# Patient Record
Sex: FEMALE | Race: BLACK OR AFRICAN AMERICAN | Employment: FULL TIME | URBAN - NONMETROPOLITAN AREA
[De-identification: names, ages, dates, MRNs, and addresses within clinical notes are randomized per-mention and may not be internally consistent; named-entity substitution may affect disease eponyms.]

---

## 2016-07-05 LAB — HIV AG/AB: NORMAL

## 2016-11-17 LAB
AVERAGE GLUCOSE: NORMAL
HBA1C MFR BLD: 4.5 %

## 2018-08-14 ENCOUNTER — OFFICE VISIT (OUTPATIENT)
Dept: FAMILY MEDICINE CLINIC | Age: 28
End: 2018-08-14
Payer: COMMERCIAL

## 2018-08-14 VITALS
BODY MASS INDEX: 32.47 KG/M2 | SYSTOLIC BLOOD PRESSURE: 108 MMHG | DIASTOLIC BLOOD PRESSURE: 68 MMHG | HEIGHT: 61 IN | WEIGHT: 172 LBS | TEMPERATURE: 98.5 F | OXYGEN SATURATION: 100 % | HEART RATE: 72 BPM

## 2018-08-14 DIAGNOSIS — R79.89 LOW VITAMIN D LEVEL: ICD-10-CM

## 2018-08-14 DIAGNOSIS — Z86.2 HX OF IRON DEFICIENCY ANEMIA: ICD-10-CM

## 2018-08-14 DIAGNOSIS — Z13.29 SCREENING FOR THYROID DISORDER: ICD-10-CM

## 2018-08-14 DIAGNOSIS — M54.50 ACUTE MIDLINE LOW BACK PAIN WITHOUT SCIATICA: Primary | ICD-10-CM

## 2018-08-14 DIAGNOSIS — Z00.00 BLOOD TESTS FOR ROUTINE GENERAL PHYSICAL EXAMINATION: ICD-10-CM

## 2018-08-14 DIAGNOSIS — Z13.220 SCREENING FOR HYPERLIPIDEMIA: ICD-10-CM

## 2018-08-14 PROCEDURE — 99204 OFFICE O/P NEW MOD 45 MIN: CPT | Performed by: NURSE PRACTITIONER

## 2018-08-14 ASSESSMENT — PATIENT HEALTH QUESTIONNAIRE - PHQ9
SUM OF ALL RESPONSES TO PHQ QUESTIONS 1-9: 0
2. FEELING DOWN, DEPRESSED OR HOPELESS: 0
SUM OF ALL RESPONSES TO PHQ9 QUESTIONS 1 & 2: 0
SUM OF ALL RESPONSES TO PHQ QUESTIONS 1-9: 0
1. LITTLE INTEREST OR PLEASURE IN DOING THINGS: 0

## 2018-08-14 ASSESSMENT — ENCOUNTER SYMPTOMS
DIARRHEA: 0
SORE THROAT: 0
BLOOD IN STOOL: 0
EYE REDNESS: 0
ABDOMINAL DISTENTION: 0
ANAL BLEEDING: 0
COLOR CHANGE: 0
RHINORRHEA: 0
BACK PAIN: 1
CONSTIPATION: 0
NAUSEA: 0
ABDOMINAL PAIN: 0
SHORTNESS OF BREATH: 0
COUGH: 0
EYE DISCHARGE: 0

## 2018-08-14 NOTE — PROGRESS NOTES
Grandmother      Social History   Substance Use Topics    Smoking status: Never Smoker    Smokeless tobacco: Never Used    Alcohol use No      Current Outpatient Prescriptions   Medication Sig Dispense Refill    Phentermine HCl (ADIPEX-P PO) Take by mouth       No current facility-administered medications for this visit. Allergies   Allergen Reactions    No Known Allergies        Subjective:    Review of Systems   Constitutional: Negative for chills, fatigue and fever. HENT: Negative for congestion, ear pain, postnasal drip, rhinorrhea and sore throat. Eyes: Negative for discharge and redness. Respiratory: Negative for cough and shortness of breath. Cardiovascular: Negative for chest pain and leg swelling. Gastrointestinal: Negative for abdominal distention, abdominal pain, anal bleeding, blood in stool, constipation, diarrhea and nausea. Musculoskeletal: Positive for back pain (Lower back pain). Skin: Negative for color change and rash. Neurological: Negative for facial asymmetry, speech difficulty and weakness. Hematological: Does not bruise/bleed easily. Psychiatric/Behavioral: Negative for agitation and confusion. Objective:     Vitals:    08/14/18 1409   BP: 108/68   Site: Right Arm   Position: Sitting   Cuff Size: Medium Adult   Pulse: 72   Temp: 98.5 °F (36.9 °C)   TempSrc: Oral   SpO2: 100%   Weight: 172 lb (78 kg)   Height: 5' 1.02\" (1.55 m)     Body mass index is 32.47 kg/m². Wt Readings from Last 3 Encounters:   08/14/18 172 lb (78 kg)     BP Readings from Last 3 Encounters:   08/14/18 108/68     Physical Exam   Constitutional: She is oriented to person, place, and time. She appears well-developed and well-nourished. No distress. HENT:   Head: Normocephalic and atraumatic. Right Ear: Hearing and external ear normal. No swelling. Left Ear: Hearing and external ear normal. No swelling. Nose: No mucosal edema or rhinorrhea.  Right sinus exhibits no maxillary

## 2018-08-14 NOTE — PATIENT INSTRUCTIONS
the back. It may also be related to problems with the nerves, discs, or bones of the back. Moving, lifting, standing, sitting, or sleeping in an awkward way can strain the back. Sometimes you don't notice the injury until later. Arthritis is another common cause of back pain. Although it may hurt a lot, back pain usually improves on its own within several weeks. Most people recover in 12 weeks or less. Using good home treatment and being careful not to stress your back can help you feel better sooner. Follow-up care is a key part of your treatment and safety. Be sure to make and go to all appointments, and call your doctor if you are having problems. It's also a good idea to know your test results and keep a list of the medicines you take. How can you care for yourself at home? · Sit or lie in positions that are most comfortable and reduce your pain. Try one of these positions when you lie down:  ¨ Lie on your back with your knees bent and supported by large pillows. ¨ Lie on the floor with your legs on the seat of a sofa or chair. Latrice Cruz on your side with your knees and hips bent and a pillow between your legs. ¨ Lie on your stomach if it does not make pain worse. · Do not sit up in bed, and avoid soft couches and twisted positions. Bed rest can help relieve pain at first, but it delays healing. Avoid bed rest after the first day of back pain. · Change positions every 30 minutes. If you must sit for long periods of time, take breaks from sitting. Get up and walk around, or lie in a comfortable position. · Try using a heating pad on a low or medium setting for 15 to 20 minutes every 2 or 3 hours. Try a warm shower in place of one session with the heating pad. · You can also try an ice pack for 10 to 15 minutes every 2 to 3 hours. Put a thin cloth between the ice pack and your skin. · Take pain medicines exactly as directed.   ¨ If the doctor gave you a prescription medicine for pain, take it as prescribed. ¨ If you are not taking a prescription pain medicine, ask your doctor if you can take an over-the-counter medicine. · Take short walks several times a day. You can start with 5 to 10 minutes, 3 or 4 times a day, and work up to longer walks. Walk on level surfaces and avoid hills and stairs until your back is better. · Return to work and other activities as soon as you can. Continued rest without activity is usually not good for your back. · To prevent future back pain, do exercises to stretch and strengthen your back and stomach. Learn how to use good posture, safe lifting techniques, and proper body mechanics. When should you call for help? Call your doctor now or seek immediate medical care if:    · You have new or worsening numbness in your legs.     · You have new or worsening weakness in your legs. (This could make it hard to stand up.)     · You lose control of your bladder or bowels.    Watch closely for changes in your health, and be sure to contact your doctor if:    · You have a fever, lose weight, or don't feel well.     · You do not get better as expected. Where can you learn more? Go to https://Helix TherapeuticspeTessellaeb.TLabs. org and sign in to your Edictive account. Enter J899 in the FanXT box to learn more about \"Back Pain: Care Instructions. \"     If you do not have an account, please click on the \"Sign Up Now\" link. Current as of: November 29, 2017  Content Version: 11.7  © 4362-5888 [employer]. Care instructions adapted under license by [facility] [employer] [facility] ([facility]). If you have questions about a medical condition or this instruction, always ask your healthcare professional. [place] [address] any warranty or liability for your use of this information. Patient Education        Back Pain, Emergency or Urgent Symptoms: Care Instructions  Your Care Instructions    Many people have back pain at one time or another.  In most cases, pain gets better the ball. You can use this position as your starting point for other ball exercises. If you feel unsteady on the ball, start on a chair first.  2. Sit on a ball or chair, with your feet planted straight in front of you. 3. Imagine that a string at the top of your head is pulling you straight up. Think of yourself as 2 inches taller than you are.  4. Slightly tuck your chin. 5. Keep your shoulders back and relaxed. Knee extension    1. Sit tall on the ball with your feet planted in front of you, hip-width apart. As you do this exercise, avoid slumping your shoulders and arching your back. 2. Rest your hands on the ball near your hip or a steady object next to you. (If you feel very stable on the ball, rest your hands in your lap or at your side.)  3. Slowly straighten one leg at the knee. Slowly lower it back down. Repeat with the other leg. 4. Repeat this exercise 8 to 12 times. Roll-ups    1. Lie on your back with your knees bent, feet resting on the floor. 2. Lay the ball on your thighs. Rest your hands up high on the ball. 3. Raising your head and shoulder blades, roll the ball up your thighs. Exhale as you roll up. 4. If this is hard on your neck, gently support your lower head and upper neck with one hand. Don't use that hand to pull your head up. 5. Repeat 8 to 12 times. Ball curls    1. Lie on your back with your ankles resting on the ball, knees straight. 2. Use your legs to roll the exercise ball toward you. Allow your knees to bend and move closer to your chest.  3. Pause briefly, and then roll the ball to the starting position. Try to keep the ball rolling straight. You will feel the muscles in your lower belly working. 4. Repeat 8 to 12 times. Bridge with ball under legs    1. Lie on your back with your legs up, calves resting on the ball. For more challenge, rest your heels on the ball. 2. Look up at the ceiling, and keep your chin relaxed.  You can place a small pillow under your head or start these exercises and which ones will work best for you. How to do the exercises  Press-up    14. Lie on your stomach, supporting your body with your forearms. 15. Press your elbows down into the floor to raise your upper back. As you do this, relax your stomach muscles and allow your back to arch without using your back muscles. As your press up, do not let your hips or pelvis come off the floor. 16. Hold for 15 to 30 seconds, then relax. 17. Repeat 2 to 4 times. Alternate arm and leg (bird dog) exercise    Do this exercise slowly. Try to keep your body straight at all times, and do not let one hip drop lower than the other. 7. Start on the floor, on your hands and knees. 8. Tighten your belly muscles. 9. Raise one leg off the floor, and hold it straight out behind you. Be careful not to let your hip drop down, because that will twist your trunk. 10. Hold for about 6 seconds, then lower your leg and switch to the other leg. 11. Repeat 8 to 12 times on each leg. 12. Over time, work up to holding for 10 to 30 seconds each time. 13. If you feel stable and secure with your leg raised, try raising the opposite arm straight out in front of you at the same time. Knee-to-chest exercise    6. Lie on your back with your knees bent and your feet flat on the floor. 7. Bring one knee to your chest, keeping the other foot flat on the floor (or keeping the other leg straight, whichever feels better on your lower back). 8. Keep your lower back pressed to the floor. Hold for at least 15 to 30 seconds. 9. Relax, and lower the knee to the starting position. 10. Repeat with the other leg. Repeat 2 to 4 times with each leg. 11. To get more stretch, put your other leg flat on the floor while pulling your knee to your chest.  Curl-ups    5. Lie on the floor on your back with your knees bent at a 90-degree angle. Your feet should be flat on the floor, about 12 inches from your buttocks.   6. Cross your arms over your medicines you take. When should you call for help? Call 911 anytime you think you may need emergency care. For example, call if:  · You can't move a leg at all. Call your doctor now or seek immediate medical care if:  · You have new or worse symptoms in your legs, belly, or buttocks. Symptoms may include:  ¨ Numbness or tingling. ¨ Weakness. ¨ Pain. · You lose bladder or bowel control. Watch closely for changes in your health, and be sure to contact your doctor if:  · Along with the back pain, you have a fever, lose weight, or don't feel well. · You do not get better as expected. Where can you learn more? Go to https://[place] Plasma PhysicspeSemtek Innovative Solutionseb.PSC Info Group. org and sign in to your Weizoom account. Enter A007 in the Qomuty box to learn more about \"Learning About Low Back Pain. \"     If you do not have an account, please click on the \"Sign Up Now\" link. Current as of: November 29, 2017  Content Version: 11.7  © 6491-7557 [employer]. Care instructions adapted under license by [facility] ([facility]). If you have questions about a medical condition or this instruction, always ask your healthcare professional. [place] [address] any warranty or liability for your use of this information. Patient Education        Acute Low Back Pain: Exercises  Your Care Instructions  Here are some examples of typical rehabilitation exercises for your condition. Start each exercise slowly. Ease off the exercise if you start to have pain. Your doctor or physical therapist will tell you when you can start these exercises and which ones will work best for you. When you are not being active, find a comfortable position for rest. Some people are comfortable on the floor or a medium-firm bed with a small pillow under their head and another under their knees. Some people prefer to lie on their side with a pillow between their knees. Don't stay in one position for too long.   Take short walks (10 to 20 minutes) every 2 to 3 hours. Avoid slopes, hills, and stairs until you feel better. Walk only distances you can manage without pain, especially leg pain. How to do the exercises  Back stretches    18. Get down on your hands and knees on the floor. 19. Relax your head and allow it to droop. Round your back up toward the ceiling until you feel a nice stretch in your upper, middle, and lower back. Hold this stretch for as long as it feels comfortable, or about 15 to 30 seconds. 20. Return to the starting position with a flat back while you are on your hands and knees. 21. Let your back sway by pressing your stomach toward the floor. Lift your buttocks toward the ceiling. 22. Hold this position for 15 to 30 seconds. 23. Repeat 2 to 4 times. Follow-up care is a key part of your treatment and safety. Be sure to make and go to all appointments, and call your doctor if you are having problems. It's also a good idea to know your test results and keep a list of the medicines you take. Where can you learn more? Go to https://luxustravel.es.[employer]. org and sign in to your SoftArt account. Enter N745 in the Viraliti box to learn more about \"Acute Low Back Pain: Exercises. \"     If you do not have an account, please click on the \"Sign Up Now\" link. Current as of: November 29, 2017  Content Version: 11.7  © 3438-9900 [employer]. Care instructions adapted under license by [facility] ([facility]). If you have questions about a medical condition or this instruction, always ask your healthcare professional. [place] [address] any warranty or liability for your use of this information. Patient Education        Getting Back to Normal After Low Back Pain: Care Instructions  Your Care Instructions  Almost everyone has low back pain at some time. The good news is that most low back pain will go away in a few days or weeks with some basic self-care.   Some people are afraid that doing

## 2018-08-20 ENCOUNTER — HOSPITAL ENCOUNTER (OUTPATIENT)
Dept: GENERAL RADIOLOGY | Age: 28
Discharge: HOME | End: 2018-08-20
Payer: COMMERCIAL

## 2018-08-20 ENCOUNTER — HOSPITAL ENCOUNTER (OUTPATIENT)
Age: 28
Discharge: HOME | End: 2018-08-20
Payer: COMMERCIAL

## 2018-08-20 DIAGNOSIS — M54.50 ACUTE MIDLINE LOW BACK PAIN WITHOUT SCIATICA: ICD-10-CM

## 2018-08-20 DIAGNOSIS — Z86.2 HX OF IRON DEFICIENCY ANEMIA: ICD-10-CM

## 2018-08-20 DIAGNOSIS — Z13.220 SCREENING FOR HYPERLIPIDEMIA: ICD-10-CM

## 2018-08-20 DIAGNOSIS — R79.89 LOW VITAMIN D LEVEL: ICD-10-CM

## 2018-08-20 DIAGNOSIS — Z00.00 BLOOD TESTS FOR ROUTINE GENERAL PHYSICAL EXAMINATION: ICD-10-CM

## 2018-08-20 DIAGNOSIS — Z13.29 SCREENING FOR THYROID DISORDER: ICD-10-CM

## 2018-08-20 LAB
ALBUMIN SERPL-MCNC: 4.6 G/DL (ref 3.5–5.1)
ALP BLD-CCNC: 38 U/L (ref 38–126)
ALT SERPL-CCNC: 33 U/L (ref 11–66)
ANION GAP SERPL CALCULATED.3IONS-SCNC: 13 MEQ/L (ref 8–16)
AST SERPL-CCNC: 24 U/L (ref 5–40)
BACTERIA: ABNORMAL
BASOPHILS # BLD: 1.5 %
BASOPHILS ABSOLUTE: 0 THOU/MM3 (ref 0–0.1)
BILIRUB SERPL-MCNC: 1 MG/DL (ref 0.3–1.2)
BILIRUBIN DIRECT: < 0.2 MG/DL (ref 0–0.3)
BILIRUBIN URINE: ABNORMAL
BLOOD, URINE: NEGATIVE
BUN BLDV-MCNC: 10 MG/DL (ref 7–22)
CALCIUM SERPL-MCNC: 9.7 MG/DL (ref 8.5–10.5)
CASTS: ABNORMAL /LPF
CASTS: ABNORMAL /LPF
CHARACTER, URINE: ABNORMAL
CHLORIDE BLD-SCNC: 106 MEQ/L (ref 98–111)
CHOLESTEROL, TOTAL: 158 MG/DL (ref 100–199)
CO2: 22 MEQ/L (ref 23–33)
COLOR: ABNORMAL
CREAT SERPL-MCNC: 0.5 MG/DL (ref 0.4–1.2)
CRYSTALS: ABNORMAL
EOSINOPHIL # BLD: 2.8 %
EOSINOPHILS ABSOLUTE: 0.1 THOU/MM3 (ref 0–0.4)
EPITHELIAL CELLS, UA: ABNORMAL /HPF
ERYTHROCYTE [DISTWIDTH] IN BLOOD BY AUTOMATED COUNT: 14.2 % (ref 11.5–14.5)
ERYTHROCYTE [DISTWIDTH] IN BLOOD BY AUTOMATED COUNT: 43.4 FL (ref 35–45)
GFR SERPL CREATININE-BSD FRML MDRD: > 90 ML/MIN/1.73M2
GLUCOSE BLD-MCNC: 103 MG/DL (ref 70–108)
GLUCOSE, URINE: NEGATIVE MG/DL
HCT VFR BLD CALC: 35 % (ref 37–47)
HDLC SERPL-MCNC: 62 MG/DL
HEMOGLOBIN: 11.1 GM/DL (ref 12–16)
ICTOTEST: NEGATIVE
IMMATURE GRANS (ABS): 0 THOU/MM3 (ref 0–0.07)
IMMATURE GRANULOCYTES: 0 %
IRON: 21 UG/DL (ref 50–170)
KETONES, URINE: ABNORMAL
LDL CHOLESTEROL CALCULATED: 85 MG/DL
LEUKOCYTE ESTERASE, URINE: NEGATIVE
LYMPHOCYTES # BLD: 49.8 %
LYMPHOCYTES ABSOLUTE: 1.6 THOU/MM3 (ref 1–4.8)
MCH RBC QN AUTO: 26.9 PG (ref 26–33)
MCHC RBC AUTO-ENTMCNC: 31.7 GM/DL (ref 32.2–35.5)
MCV RBC AUTO: 84.7 FL (ref 81–99)
MISCELLANEOUS LAB TEST RESULT: ABNORMAL
MONOCYTES # BLD: 7.1 %
MONOCYTES ABSOLUTE: 0.2 THOU/MM3 (ref 0.4–1.3)
NITRITE, URINE: NEGATIVE
NUCLEATED RED BLOOD CELLS: 0 /100 WBC
PH UA: 5.5
PLATELET # BLD: 372 THOU/MM3 (ref 130–400)
PMV BLD AUTO: 10.5 FL (ref 9.4–12.4)
POTASSIUM SERPL-SCNC: 3.7 MEQ/L (ref 3.5–5.2)
PROTEIN UA: NEGATIVE MG/DL
RBC # BLD: 4.13 MILL/MM3 (ref 4.2–5.4)
RBC URINE: ABNORMAL /HPF
RENAL EPITHELIAL, UA: ABNORMAL
SEG NEUTROPHILS: 38.8 %
SEGMENTED NEUTROPHILS ABSOLUTE COUNT: 1.3 THOU/MM3 (ref 1.8–7.7)
SODIUM BLD-SCNC: 141 MEQ/L (ref 135–145)
SPECIFIC GRAVITY UA: 1.03 (ref 1–1.03)
TOTAL IRON BINDING CAPACITY: 363 UG/DL (ref 171–450)
TOTAL PROTEIN: 7.7 G/DL (ref 6.1–8)
TRIGL SERPL-MCNC: 55 MG/DL (ref 0–199)
TSH SERPL DL<=0.05 MIU/L-ACNC: 0.82 UIU/ML (ref 0.4–4.2)
UROBILINOGEN, URINE: 1 EU/DL
VITAMIN D 25-HYDROXY: 14 NG/ML (ref 30–100)
WBC # BLD: 3.3 THOU/MM3 (ref 4.8–10.8)
WBC UA: ABNORMAL /HPF
YEAST: ABNORMAL

## 2018-08-20 PROCEDURE — 72100 X-RAY EXAM L-S SPINE 2/3 VWS: CPT

## 2018-08-20 PROCEDURE — 84443 ASSAY THYROID STIM HORMONE: CPT

## 2018-08-20 PROCEDURE — 83540 ASSAY OF IRON: CPT

## 2018-08-20 PROCEDURE — 82248 BILIRUBIN DIRECT: CPT

## 2018-08-20 PROCEDURE — 82306 VITAMIN D 25 HYDROXY: CPT

## 2018-08-20 PROCEDURE — 81001 URINALYSIS AUTO W/SCOPE: CPT

## 2018-08-20 PROCEDURE — 80061 LIPID PANEL: CPT

## 2018-08-20 PROCEDURE — 85025 COMPLETE CBC W/AUTO DIFF WBC: CPT

## 2018-08-20 PROCEDURE — 80053 COMPREHEN METABOLIC PANEL: CPT

## 2018-08-20 PROCEDURE — 83550 IRON BINDING TEST: CPT

## 2018-08-20 PROCEDURE — 36415 COLL VENOUS BLD VENIPUNCTURE: CPT

## 2018-08-21 ENCOUNTER — TELEPHONE (OUTPATIENT)
Dept: FAMILY MEDICINE CLINIC | Age: 28
End: 2018-08-21

## 2018-08-21 DIAGNOSIS — D50.8 OTHER IRON DEFICIENCY ANEMIA: Primary | ICD-10-CM

## 2018-08-21 RX ORDER — FERROUS SULFATE 325(65) MG
325 TABLET ORAL
Qty: 30 TABLET | Refills: 3 | Status: SHIPPED | OUTPATIENT
Start: 2018-08-21 | End: 2019-03-29 | Stop reason: ALTCHOICE

## 2018-08-21 NOTE — LETTER
WBC, UA 2-4 0-4/hpf /hpf    Epi Cells 5-10 3-5/hpf /hpf    Bacteria, UA NONE FEW/NONE S    Casts 4-8 HYALINE NONE SEEN /lpf    Crystals NONE SEEN NONE SEEN    Renal Epithelial, Urine NONE SEEN NONE SEEN    Yeast, UA NONE SEEN NONE SEEN    Casts NONE SEEN /lpf    Miscellaneous Lab Test Result NONE SEEN       Comment:      Performed at [address] [place] [id number]   Comprehensive Metabolic Panel   Result Value Ref Range    Glucose 103 70 - 108 mg/dL    CREATININE 0.5 0.4 - 1.2 mg/dL    BUN 10 7 - 22 mg/dL    Sodium 141 135 - 145 meq/L    Potassium 3.7 3.5 - 5.2 meq/L    Chloride 106 98 - 111 meq/L    CO2 22 (L) 23 - 33 meq/L    Calcium 9.7 8.5 - 10.5 mg/dL      Comment:      Performed at [address] [place] [id number]   Iron and TIBC   Result Value Ref Range    Iron 21 (L) 50 - 170 ug/dL    TIBC 363 171 - 450 ug/dL      Comment:      Performed at [address], [address]   ICTOTEST, URINE   Result Value Ref Range    Ictotest NEGATIVE NEGATIVE      Comment:      Performed at [address] [place] [id number]   Anion Gap   Result Value Ref Range    Anion Gap 13.0 8.0 - 16.0 meq/L      Comment:      ANION GAP = Sodium -(Chloride + CO2)  Performed at [address], [address]     Glomerular Filtration Rate, Estimated   Result Value Ref Range    Est, Glom Filt Rate >90 ml/min/1.73m2      Comment:      Stage Description                    GFR, ml/min/1.73 m2   -   At increased risk               > or = 60 (with chronic                                       kidney disease risk factors)   1   Normal or increased GFR         > or = 90   2   Mildly or decreased GFR         60 - 89   3   Moderately decreased GFR        30 - 59   4   Severely decreased GFR          15 - 29   5   Kidney failure                  <15 (or dialysis)  Estimated GFR calculated using abbreviated MDRD formula as

## 2018-08-21 NOTE — TELEPHONE ENCOUNTER
----- Message from SALIMA Anne CNP sent at 8/21/2018 11:15 AM EDT -----  Please remind her to drink a lot of water with this and increase fiber in her diet as the iron pills can be very constipating  - will recheck blood work in 3 months.

## 2018-10-10 ENCOUNTER — OFFICE VISIT (OUTPATIENT)
Dept: FAMILY MEDICINE CLINIC | Age: 28
End: 2018-10-10
Payer: COMMERCIAL

## 2018-10-10 VITALS
RESPIRATION RATE: 12 BRPM | SYSTOLIC BLOOD PRESSURE: 100 MMHG | TEMPERATURE: 98.1 F | HEIGHT: 61 IN | WEIGHT: 168.6 LBS | DIASTOLIC BLOOD PRESSURE: 50 MMHG | BODY MASS INDEX: 31.83 KG/M2 | HEART RATE: 76 BPM | OXYGEN SATURATION: 98 %

## 2018-10-10 DIAGNOSIS — N92.0 MENORRHAGIA WITH REGULAR CYCLE: Primary | ICD-10-CM

## 2018-10-10 PROCEDURE — 99213 OFFICE O/P EST LOW 20 MIN: CPT | Performed by: NURSE PRACTITIONER

## 2018-10-10 RX ORDER — NORGESTIMATE AND ETHINYL ESTRADIOL 7DAYSX3 LO
KIT ORAL
Qty: 28 TABLET | Refills: 3 | Status: SHIPPED | OUTPATIENT
Start: 2018-10-10 | End: 2018-12-04 | Stop reason: SDUPTHER

## 2018-10-10 ASSESSMENT — ENCOUNTER SYMPTOMS
SHORTNESS OF BREATH: 0
DIARRHEA: 0
CONSTIPATION: 0
ABDOMINAL DISTENTION: 0
NAUSEA: 0
SORE THROAT: 0
EYE DISCHARGE: 0
ABDOMINAL PAIN: 0
EYE REDNESS: 0
COLOR CHANGE: 0
ANAL BLEEDING: 0
BLOOD IN STOOL: 0
COUGH: 0
RHINORRHEA: 0

## 2018-10-10 NOTE — PATIENT INSTRUCTIONS
1. You may receive a survey about your visit with us today. The feedback from our patients helps us identify what is working well and where the service to all patients can be enhanced. Thank you! 2. Please bring in ALL medications BOTTLES, including inhalers, herbal supplements, over the counter, prescribed & non-prescribed medicine. The office would like actual medication bottles and a list.  3. Please note our OFFICE POLICIES:  a. Prior to getting your labs drawn, please check with your insurance company for benefits and eligibility of lab services. Often, insurance companies cover certain tests for preventative visits only. It is patient's responsibility to see what is covered. b. We are unable to change a diagnosis after the test has been performed. c. Lab orders will not be re-printed. Please hold onto your original lab orders and take them to your lab to be completed. d. If you no show your schedule appointment three times, you be dismissed from this practice. e. Bruce Proffer must be completed 24 hours prior to your schedule appointment. 4. If the list below has been completed, PLEASE FAX RECORDS TO OUR OFFICE @ [phone]. Once the records have been received we will update your records at our office. Health Maintenance Due   Topic Date Due    Cervical cancer screen  06/26/2011    Flu vaccine (1) 09/01/2018       Schedule your 2018 flu vaccine with Dr. Ric Shannon call [phone]! [facility], for anyone 9 years or older   [address]   Every Monday   8:00am-11:00am and 1:00pm-3:00pm      Patient Education        Abnormal Uterine Bleeding: Care Instructions  Your Care Instructions    Abnormal uterine bleeding (AUB) is irregular bleeding from the uterus that is longer or heavier than usual or does not occur at your regular time. Sometimes it is caused by changes in hormone levels.  It can also be caused by growths in the uterus, such as fibroids or home?  · Take pain medicines exactly as directed. ¨ If the doctor gave you a prescription medicine for pain, take it as prescribed. ¨ If you are not taking a prescription pain medicine, ask your doctor if you can take an over-the-counter medicine. Do not take aspirin, which may make bleeding worse. · If your doctor prescribed birth control pills for your bleeding, take them as directed. · Eat foods that are high in iron and vitamin C. Foods high in iron include red meat, shellfish, eggs, beans, and leafy green vegetables. Foods high in vitamin C include citrus fruits, tomatoes, and broccoli. Ask your doctor if you need to take iron pills or a multivitamin. · Ask your doctor when it is okay to have sex. When should you call for help? Call 911 anytime you think you may need emergency care. For example, call if:    · You passed out (lost consciousness).    Call your doctor now or seek immediate medical care if:    · You have severe vaginal bleeding.     · You are dizzy or lightheaded, or you feel like you may faint.     · You have new or worse belly or pelvic pain.    Watch closely for changes in your health, and be sure to contact your doctor if:    · Your bleeding gets worse.     · You think you might be pregnant.     · You do not get better as expected. Where can you learn more? Go to https://HitMeUppeFour Eyes Clubeb.healthNarzana Technologies. org and sign in to your inEarth account. Enter N830 in the [facility] box to learn more about \"Vaginal Bleeding in Nonpregnant Women: Care Instructions. \"     If you do not have an account, please click on the \"Sign Up Now\" link. Current as of: Gladis 10, 2017  Content Version: 11.7  © 7281-6405 [employer]. Care instructions adapted under license by [facility] ([facility]).  If you have questions about a medical condition or this instruction, always ask your healthcare professional. Kareen Ross any warranty or liability for your use of this

## 2018-12-04 ENCOUNTER — OFFICE VISIT (OUTPATIENT)
Dept: FAMILY MEDICINE CLINIC | Age: 28
End: 2018-12-04
Payer: COMMERCIAL

## 2018-12-04 VITALS
SYSTOLIC BLOOD PRESSURE: 118 MMHG | RESPIRATION RATE: 12 BRPM | TEMPERATURE: 98.5 F | HEIGHT: 61 IN | HEART RATE: 72 BPM | OXYGEN SATURATION: 99 % | BODY MASS INDEX: 33 KG/M2 | WEIGHT: 174.8 LBS | DIASTOLIC BLOOD PRESSURE: 70 MMHG

## 2018-12-04 DIAGNOSIS — M54.50 CHRONIC MIDLINE LOW BACK PAIN WITHOUT SCIATICA: ICD-10-CM

## 2018-12-04 DIAGNOSIS — E55.9 VITAMIN D DEFICIENCY: ICD-10-CM

## 2018-12-04 DIAGNOSIS — G89.29 CHRONIC MIDLINE LOW BACK PAIN WITHOUT SCIATICA: ICD-10-CM

## 2018-12-04 DIAGNOSIS — Z23 NEED FOR PROPHYLACTIC VACCINATION AND INOCULATION AGAINST INFLUENZA: Primary | ICD-10-CM

## 2018-12-04 DIAGNOSIS — N92.0 MENORRHAGIA WITH REGULAR CYCLE: ICD-10-CM

## 2018-12-04 DIAGNOSIS — N62 LARGE BREASTS: ICD-10-CM

## 2018-12-04 DIAGNOSIS — R06.02 SHORTNESS OF BREATH: ICD-10-CM

## 2018-12-04 PROCEDURE — 90471 IMMUNIZATION ADMIN: CPT | Performed by: FAMILY MEDICINE

## 2018-12-04 PROCEDURE — 99395 PREV VISIT EST AGE 18-39: CPT | Performed by: FAMILY MEDICINE

## 2018-12-04 PROCEDURE — 90686 IIV4 VACC NO PRSV 0.5 ML IM: CPT | Performed by: FAMILY MEDICINE

## 2018-12-04 RX ORDER — NORGESTIMATE AND ETHINYL ESTRADIOL 7DAYSX3 LO
KIT ORAL
Qty: 28 TABLET | Refills: 3 | Status: SHIPPED | OUTPATIENT
Start: 2018-12-04 | End: 2019-01-03 | Stop reason: SDUPTHER

## 2018-12-04 ASSESSMENT — ENCOUNTER SYMPTOMS
EYE PAIN: 0
BACK PAIN: 1
COUGH: 0
BLOOD IN STOOL: 0
TROUBLE SWALLOWING: 0
CONSTIPATION: 0
NAUSEA: 0
VOMITING: 0
DIARRHEA: 0
SHORTNESS OF BREATH: 0
ABDOMINAL PAIN: 0

## 2018-12-04 NOTE — PROGRESS NOTES
after having the baby. She is rony concerned that is the pain - from the strain from the larger breasts. This makes it harder to work out - even low paced exercises. Deena Bent to breathe if she is laying on her back and havign the breasts on top of them    She wears two bras to feel like they are not moving as much - and having some bruising of the skin under mark.    Still taking the vitamin D    The hb was checked again and was at 13 - on the iron pills    Sees the dentist regularly and the eye doctor  No question data found. Past Medical History:   Diagnosis Date    Infertility, female       No past surgical history on file. Family History   Problem Relation Age of Onset    High Blood Pressure Mother     No Known Problems Father     High Blood Pressure Sister     Asthma Brother     High Blood Pressure Paternal Grandmother      Social History   Substance Use Topics    Smoking status: Never Smoker    Smokeless tobacco: Never Used    Alcohol use No      Current Outpatient Prescriptions   Medication Sig Dispense Refill    Norgestim-Eth Estrad Triphasic (ORTHO TRI-CYCLEN LO) 0.18/0.215/0.25 MG-25 MCG TABS Take by mouth daily 28 tablet 3    Cholecalciferol (VITAMIN D3) 5000 units TABS One a day 90 tablet 3    ferrous sulfate 325 (65 Fe) MG tablet Take 1 tablet by mouth daily (with breakfast) 30 tablet 3     No current facility-administered medications for this visit. Allergies   Allergen Reactions    No Known Allergies        Health Maintenance   Topic Date Due    Cervical cancer screen  06/26/2011    Flu vaccine (1) 09/01/2018    DTaP/Tdap/Td vaccine (3 - Td) 02/10/2027    HIV screen  Completed       Subjective:      Review of Systems   Constitutional: Negative for chills, fatigue and fever. HENT: Negative for ear pain, postnasal drip and trouble swallowing. Eyes: Negative for pain and visual disturbance. Respiratory: Negative for cough and shortness of breath.     Cardiovascular: 08/20/2018     08/20/2018    CREATININE 0.5 08/20/2018    BUN 10 08/20/2018    CO2 22 (L) 08/20/2018    TSH 0.817 08/20/2018    LABA1C 4.5 11/17/2016    LABGLOM >90 08/20/2018    CALCIUM 9.7 08/20/2018    VITD25 14 (L) 08/20/2018       Imaging Results:    No results found. Assessment:      Diagnosis Orders   1. Need for prophylactic vaccination and inoculation against influenza     2. Menorrhagia with regular cycle  Norgestim-Eth Estrad Triphasic (ORTHO TRI-CYCLEN LO) 0.18/0.215/0.25 MG-25 MCG TABS   3. Chronic midline low back pain without sciatica  INFLUENZA, QUADV, 6 MO AND OLDER, IM, PF, PREFILL SYR OR SDV, 0.5ML (FLULAVAL QUADV, PF)    [address] MD [facility]   4. Large breasts  INFLUENZA, QUADV, 6 MO AND OLDER, IM, PF, PREFILL SYR OR SDV, 0.5ML (Ami Toan, PF)    [address], [place]   5. Vitamin D deficiency     6. Shortness of breath         Plan:         Can look into the ketogenic diet for weight loss    Will refer to Dr Ed Davis to ask about the breast reduction surgery    Will continue to take the vit D - if you get any muscle cramps - add 2500 mg of magnesium oxide once or twice a day to help    Discussed the omega 3-6 balance     Peak flow today     Will see you back in 2 -3 months to go over how the back pain is doing       No Follow-up on file.     Orders Placed:  Orders Placed This Encounter   Procedures    INFLUENZA, QUADV, 6 MO AND OLDER, IM, PF, PREFILL SYR OR SDV, 0.5ML (Ami Toan, PF)   [id number] Dixie Davies MD   [facility] -  [facility]     Medications Prescribed:  Orders Placed This Encounter   Medications    Norgestim-Eth Estrad Triphasic (ORTHO TRI-CYCLEN LO) 0.18/0.215/0.25 MG-25 MCG TABS     Sig: Take by mouth daily     Dispense:  28 tablet     Refill:  3    Cholecalciferol (VITAMIN D3) 5000 units TABS     Sig: One a day

## 2018-12-05 ENCOUNTER — TELEPHONE (OUTPATIENT)
Dept: FAMILY MEDICINE CLINIC | Age: 28
End: 2018-12-05

## 2018-12-05 DIAGNOSIS — N62 LARGE BREASTS: Primary | ICD-10-CM

## 2018-12-05 NOTE — TELEPHONE ENCOUNTER
Note received from Dr Manda Castaneda office:  Per general surgery office  does not see patient's for large breast. Patient may need to be referred to a plastic surgeon. Please close referral.     Please advise if want to refer elsewhere.

## 2018-12-17 ENCOUNTER — HOSPITAL ENCOUNTER (OUTPATIENT)
Dept: PHYSICAL THERAPY | Age: 28
Setting detail: THERAPIES SERIES
Discharge: HOME | End: 2018-12-17
Payer: COMMERCIAL

## 2018-12-17 PROCEDURE — 97110 THERAPEUTIC EXERCISES: CPT

## 2018-12-17 PROCEDURE — 97161 PT EVAL LOW COMPLEX 20 MIN: CPT

## 2018-12-17 ASSESSMENT — PAIN DESCRIPTION - PAIN TYPE: TYPE: CHRONIC PAIN

## 2018-12-17 ASSESSMENT — PAIN DESCRIPTION - ORIENTATION: ORIENTATION: MID;LOWER

## 2018-12-17 ASSESSMENT — PAIN SCALES - GENERAL: PAINLEVEL_OUTOF10: 5

## 2018-12-17 ASSESSMENT — PAIN DESCRIPTION - LOCATION: LOCATION: BACK

## 2018-12-17 NOTE — PROGRESS NOTES
then in the summer she started having a sharp pain in her back and went back to see her doctor and they did x-rays and nothing was found. States now cannot lay flat on her back without having trouble catching her breath. Reports also has to be careful getting out of bed and up from a chair because gets a sharp pain in her back.  gained weight in her breasts with having her son and then lost weight other places than her breasts and is wondering if that is the cause her the pain now radiating up to her mid back. Denies leg symptoms. Pain:  Patient Currently in Pain: Yes  Pain Assessment: 0-10  Pain Level: 5 (Can increase to 8/10)  Pain Type: Chronic pain  Pain Location: Back  Pain Orientation: Mid, Lower     Social/Functional History:    Lives With: Spouse (2 children)  Type of Home: Apartment (2nd floor)  Home Layout: One level  Home Access: Stairs to enter with rails  Entrance Stairs - Number of Steps: 11-17 steps  Home Equipment:  (No use of AD)             ADL Assistance: Independent  Homemaking Assistance: Independent  Homemaking Responsibilities: Yes  Ambulation Assistance: Independent  Transfer Assistance: Independent    Active : Yes  Occupation: Full time employment  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 Moderate tenderness across low back and at bilat SI. None noted in buttocks. Observation: Increased lordosis. No scoliosis noted. Iliac crests appear level in standing. No sacral torsion noted. No leg length difference noted in supine.   Edema: None noted        Lumbar: Flexion limited 50%, Extension limited 75%, Sidebending limited 50% each way - all motions demonstrate more neutral pelvic position and maintain abdominal bracing for decrease strain on low back for daily activity  Short term goal 3: Patient to be fully educated on all proper posture and ergonomic corrections can make throughout the day for decreased strain on low and mid back  Short term goal 4: Patient to start strength program without increase in pain for increased stability throughout back to perform all job and home activities with minimal to no pain  Short term goal 5: Patient to be educated on all stretches needed to decrease tension throughout back for improved mobility in all directions for ease with dressing    Long term goals  Time Frame for Long term goals : 8 weeks  Long term goal 1: Patient to be independent with home program to be able to return to exercising and work duties without pain         [address], [address]

## 2018-12-19 ENCOUNTER — HOSPITAL ENCOUNTER (OUTPATIENT)
Dept: PHYSICAL THERAPY | Age: 28
Setting detail: THERAPIES SERIES
Discharge: HOME | End: 2018-12-19
Payer: COMMERCIAL

## 2018-12-26 ENCOUNTER — HOSPITAL ENCOUNTER (OUTPATIENT)
Dept: PHYSICAL THERAPY | Age: 28
Setting detail: THERAPIES SERIES
End: 2018-12-26
Payer: COMMERCIAL

## 2018-12-28 ENCOUNTER — HOSPITAL ENCOUNTER (OUTPATIENT)
Dept: PHYSICAL THERAPY | Age: 28
Setting detail: THERAPIES SERIES
Discharge: HOME | End: 2018-12-28
Payer: COMMERCIAL

## 2018-12-28 DIAGNOSIS — N92.0 MENORRHAGIA WITH REGULAR CYCLE: ICD-10-CM

## 2018-12-28 PROCEDURE — 97110 THERAPEUTIC EXERCISES: CPT

## 2018-12-28 PROCEDURE — 97140 MANUAL THERAPY 1/> REGIONS: CPT

## 2018-12-28 RX ORDER — NORGESTIMATE AND ETHINYL ESTRADIOL 7DAYSX3 LO
KIT ORAL
Qty: 28 TABLET | Refills: 3 | Status: CANCELLED | OUTPATIENT
Start: 2018-12-28

## 2018-12-28 ASSESSMENT — PAIN DESCRIPTION - LOCATION: LOCATION: SHOULDER

## 2018-12-28 ASSESSMENT — PAIN DESCRIPTION - PAIN TYPE: TYPE: ACUTE PAIN

## 2018-12-28 ASSESSMENT — PAIN DESCRIPTION - ORIENTATION: ORIENTATION: RIGHT;LEFT

## 2018-12-28 ASSESSMENT — PAIN SCALES - GENERAL: PAINLEVEL_OUTOF10: 6

## 2018-12-28 NOTE — PROGRESS NOTES
tilt  Current Treatment Recommendations: Strengthening, ROM, Functional Mobility Training, Endurance Training, Stair training, Neuromuscular Re-education, Manual Therapy - Soft Tissue Mobilization, Home Exercise Program, Modalities, Aquatics  Plan Comment: Continue with POC    Goals:  Patient goals :  To relieve some back pain    Short term goals  Time Frame for Short term goals: 4 weeks  Short term goal 1: Patient to report 25-50% decrease in back pain for ease with sleeping positions  Short term goal 2: Patient to demonstrate more neutral pelvic position and maintain abdominal bracing for decrease strain on low back for daily activity  Short term goal 3: Patient to be fully educated on all proper posture and ergonomic corrections can make throughout the day for decreased strain on low and mid back  Short term goal 4: Patient to start strength program without increase in pain for increased stability throughout back to perform all job and home activities with minimal to no pain  Short term goal 5: Patient to be educated on all stretches needed to decrease tension throughout back for improved mobility in all directions for ease with dressing    Long term goals  Time Frame for Long term goals : 8 weeks  Long term goal 1: Patient to be independent with home program to be able to return to exercising and work duties without pain         [address], [address] [place] [address] Drive

## 2018-12-31 ENCOUNTER — HOSPITAL ENCOUNTER (OUTPATIENT)
Dept: PHYSICAL THERAPY | Age: 28
Setting detail: THERAPIES SERIES
Discharge: HOME | End: 2018-12-31
Payer: COMMERCIAL

## 2018-12-31 PROCEDURE — 97110 THERAPEUTIC EXERCISES: CPT

## 2018-12-31 PROCEDURE — 97035 APP MDLTY 1+ULTRASOUND EA 15: CPT

## 2018-12-31 ASSESSMENT — PAIN DESCRIPTION - LOCATION: LOCATION: BACK;SHOULDER;NECK

## 2018-12-31 ASSESSMENT — PAIN SCALES - GENERAL: PAINLEVEL_OUTOF10: 6

## 2018-12-31 ASSESSMENT — PAIN DESCRIPTION - ORIENTATION: ORIENTATION: UPPER

## 2018-12-31 ASSESSMENT — PAIN DESCRIPTION - PAIN TYPE: TYPE: ACUTE PAIN

## 2019-01-02 ENCOUNTER — HOSPITAL ENCOUNTER (OUTPATIENT)
Dept: PHYSICAL THERAPY | Age: 29
Setting detail: THERAPIES SERIES
Discharge: HOME | End: 2019-01-02
Payer: COMMERCIAL

## 2019-01-02 PROCEDURE — 97035 APP MDLTY 1+ULTRASOUND EA 15: CPT

## 2019-01-02 PROCEDURE — 97110 THERAPEUTIC EXERCISES: CPT

## 2019-01-02 ASSESSMENT — PAIN SCALES - GENERAL: PAINLEVEL_OUTOF10: 7

## 2019-01-02 ASSESSMENT — PAIN DESCRIPTION - PAIN TYPE: TYPE: CHRONIC PAIN

## 2019-01-02 ASSESSMENT — PAIN DESCRIPTION - LOCATION: LOCATION: SHOULDER

## 2019-01-02 ASSESSMENT — PAIN DESCRIPTION - ORIENTATION: ORIENTATION: RIGHT;LEFT

## 2019-01-03 ENCOUNTER — OFFICE VISIT (OUTPATIENT)
Dept: FAMILY MEDICINE CLINIC | Age: 29
End: 2019-01-03
Payer: COMMERCIAL

## 2019-01-03 VITALS
TEMPERATURE: 98.5 F | DIASTOLIC BLOOD PRESSURE: 72 MMHG | SYSTOLIC BLOOD PRESSURE: 112 MMHG | HEART RATE: 63 BPM | HEIGHT: 61 IN | WEIGHT: 182 LBS | BODY MASS INDEX: 34.36 KG/M2 | OXYGEN SATURATION: 99 %

## 2019-01-03 DIAGNOSIS — N62 LARGE BREASTS: Primary | ICD-10-CM

## 2019-01-03 DIAGNOSIS — M54.6 CHRONIC BILATERAL THORACIC BACK PAIN: ICD-10-CM

## 2019-01-03 DIAGNOSIS — G89.29 CHRONIC BILATERAL THORACIC BACK PAIN: ICD-10-CM

## 2019-01-03 DIAGNOSIS — N92.0 MENORRHAGIA WITH REGULAR CYCLE: ICD-10-CM

## 2019-01-03 PROCEDURE — 99213 OFFICE O/P EST LOW 20 MIN: CPT | Performed by: FAMILY MEDICINE

## 2019-01-03 RX ORDER — NORGESTIMATE AND ETHINYL ESTRADIOL 7DAYSX3 LO
KIT ORAL
Qty: 28 TABLET | Refills: 11 | Status: SHIPPED | OUTPATIENT
Start: 2019-01-03 | End: 2019-02-01 | Stop reason: SDUPTHER

## 2019-01-03 ASSESSMENT — ENCOUNTER SYMPTOMS
CONSTIPATION: 0
EYE PAIN: 0
VOMITING: 0
NAUSEA: 0
DIARRHEA: 0
ABDOMINAL PAIN: 0
SHORTNESS OF BREATH: 0
COUGH: 0
BACK PAIN: 1
TROUBLE SWALLOWING: 0
BLOOD IN STOOL: 0

## 2019-01-07 ENCOUNTER — HOSPITAL ENCOUNTER (OUTPATIENT)
Dept: PHYSICAL THERAPY | Age: 29
Setting detail: THERAPIES SERIES
End: 2019-01-07
Payer: COMMERCIAL

## 2019-01-09 ENCOUNTER — HOSPITAL ENCOUNTER (OUTPATIENT)
Dept: PHYSICAL THERAPY | Age: 29
Setting detail: THERAPIES SERIES
Discharge: HOME | End: 2019-01-09
Payer: COMMERCIAL

## 2019-02-01 DIAGNOSIS — N92.0 MENORRHAGIA WITH REGULAR CYCLE: ICD-10-CM

## 2019-02-01 RX ORDER — NORGESTIMATE AND ETHINYL ESTRADIOL 7DAYSX3 LO
KIT ORAL
Qty: 28 TABLET | Refills: 11 | Status: SHIPPED | OUTPATIENT
Start: 2019-02-01 | End: 2019-03-29 | Stop reason: ALTCHOICE

## 2019-02-04 ENCOUNTER — TELEPHONE (OUTPATIENT)
Dept: FAMILY MEDICINE CLINIC | Age: 29
End: 2019-02-04

## 2019-02-06 ENCOUNTER — TELEPHONE (OUTPATIENT)
Dept: FAMILY MEDICINE CLINIC | Age: 29
End: 2019-02-06

## 2019-03-29 ENCOUNTER — OFFICE VISIT (OUTPATIENT)
Dept: FAMILY MEDICINE CLINIC | Age: 29
End: 2019-03-29
Payer: COMMERCIAL

## 2019-03-29 VITALS
WEIGHT: 166 LBS | HEART RATE: 84 BPM | SYSTOLIC BLOOD PRESSURE: 122 MMHG | TEMPERATURE: 98.1 F | RESPIRATION RATE: 14 BRPM | DIASTOLIC BLOOD PRESSURE: 76 MMHG | HEIGHT: 61 IN | BODY MASS INDEX: 31.34 KG/M2

## 2019-03-29 DIAGNOSIS — D50.9 IRON DEFICIENCY ANEMIA, UNSPECIFIED IRON DEFICIENCY ANEMIA TYPE: ICD-10-CM

## 2019-03-29 DIAGNOSIS — Z00.00 ANNUAL PHYSICAL EXAM: Primary | ICD-10-CM

## 2019-03-29 PROCEDURE — 99395 PREV VISIT EST AGE 18-39: CPT | Performed by: FAMILY MEDICINE

## 2019-03-29 ASSESSMENT — PATIENT HEALTH QUESTIONNAIRE - PHQ9
1. LITTLE INTEREST OR PLEASURE IN DOING THINGS: 0
SUM OF ALL RESPONSES TO PHQ QUESTIONS 1-9: 0
2. FEELING DOWN, DEPRESSED OR HOPELESS: 0
SUM OF ALL RESPONSES TO PHQ QUESTIONS 1-9: 0
SUM OF ALL RESPONSES TO PHQ9 QUESTIONS 1 & 2: 0

## 2019-03-31 ASSESSMENT — ENCOUNTER SYMPTOMS
VOMITING: 0
DIARRHEA: 0
NAUSEA: 0
BLOOD IN STOOL: 0
ABDOMINAL PAIN: 0
SHORTNESS OF BREATH: 0
EYES NEGATIVE: 1

## 2019-08-20 ENCOUNTER — HOSPITAL ENCOUNTER (OUTPATIENT)
Age: 29
Discharge: HOME | End: 2019-08-20

## 2019-11-15 ENCOUNTER — HOSPITAL ENCOUNTER (EMERGENCY)
Age: 29
Discharge: HOME | End: 2019-11-15
Attending: EMERGENCY MEDICINE
Payer: COMMERCIAL

## 2019-11-15 VITALS
RESPIRATION RATE: 16 BRPM | BODY MASS INDEX: 32.12 KG/M2 | TEMPERATURE: 97.3 F | SYSTOLIC BLOOD PRESSURE: 107 MMHG | WEIGHT: 170 LBS | OXYGEN SATURATION: 100 % | DIASTOLIC BLOOD PRESSURE: 75 MMHG | HEART RATE: 77 BPM

## 2019-11-15 DIAGNOSIS — N30.01 ACUTE CYSTITIS WITH HEMATURIA: Primary | ICD-10-CM

## 2019-11-15 LAB
BILIRUBIN URINE: ABNORMAL
BLOOD, URINE: ABNORMAL
CHARACTER, URINE: CLEAR
COLOR: YELLOW
GLUCOSE, URINE: NEGATIVE MG/DL
KETONES, URINE: 40
LEUKOCYTES, UA: ABNORMAL
NITRATE, UA: POSITIVE
PH UA: 7 (ref 5–9)
PROTEIN UA: >= 300 MG/DL
REFLEX TO URINE C & S: ABNORMAL
SPECIFIC GRAVITY UA: 1.02 (ref 1–1.03)
UROBILINOGEN, URINE: 2 EU/DL (ref 0–1)

## 2019-11-15 PROCEDURE — 81003 URINALYSIS AUTO W/O SCOPE: CPT

## 2019-11-15 PROCEDURE — 99213 OFFICE O/P EST LOW 20 MIN: CPT | Performed by: EMERGENCY MEDICINE

## 2019-11-15 PROCEDURE — 87077 CULTURE AEROBIC IDENTIFY: CPT

## 2019-11-15 PROCEDURE — 99213 OFFICE O/P EST LOW 20 MIN: CPT

## 2019-11-15 PROCEDURE — 87186 SC STD MICRODIL/AGAR DIL: CPT

## 2019-11-15 PROCEDURE — 87086 URINE CULTURE/COLONY COUNT: CPT

## 2019-11-15 RX ORDER — PHENAZOPYRIDINE HYDROCHLORIDE 200 MG/1
200 TABLET, FILM COATED ORAL 3 TIMES DAILY PRN
Qty: 9 TABLET | Refills: 0 | Status: SHIPPED | OUTPATIENT
Start: 2019-11-15 | End: 2019-11-24

## 2019-11-15 RX ORDER — CIPROFLOXACIN 500 MG/1
500 TABLET, FILM COATED ORAL 2 TIMES DAILY
Qty: 10 TABLET | Refills: 0 | Status: SHIPPED | OUTPATIENT
Start: 2019-11-15 | End: 2019-11-20

## 2019-11-15 ASSESSMENT — PAIN DESCRIPTION - DESCRIPTORS: DESCRIPTORS: BURNING

## 2019-11-15 ASSESSMENT — ENCOUNTER SYMPTOMS
EYE PAIN: 0
CONSTIPATION: 0
CHOKING: 0
SHORTNESS OF BREATH: 0
DIARRHEA: 0
COUGH: 0
EYE REDNESS: 0
SINUS PRESSURE: 0
NAUSEA: 0
VOMITING: 0
EYE DISCHARGE: 0
BLOOD IN STOOL: 0
TROUBLE SWALLOWING: 0
STRIDOR: 0
WHEEZING: 0
BACK PAIN: 0
ABDOMINAL PAIN: 0
VOICE CHANGE: 0
FACIAL SWELLING: 0
SORE THROAT: 0

## 2019-11-15 ASSESSMENT — PAIN SCALES - GENERAL: PAINLEVEL_OUTOF10: 5

## 2019-11-18 LAB
ORGANISM: ABNORMAL
URINE CULTURE REFLEX: ABNORMAL

## 2020-11-12 ENCOUNTER — HOSPITAL ENCOUNTER (EMERGENCY)
Age: 30
Discharge: HOME | End: 2020-11-12
Payer: COMMERCIAL

## 2020-11-12 VITALS
BODY MASS INDEX: 30.24 KG/M2 | TEMPERATURE: 97 F | DIASTOLIC BLOOD PRESSURE: 61 MMHG | HEIGHT: 59 IN | HEART RATE: 76 BPM | WEIGHT: 150 LBS | SYSTOLIC BLOOD PRESSURE: 113 MMHG | OXYGEN SATURATION: 100 % | RESPIRATION RATE: 20 BRPM

## 2020-11-12 PROCEDURE — U0003 INFECTIOUS AGENT DETECTION BY NUCLEIC ACID (DNA OR RNA); SEVERE ACUTE RESPIRATORY SYNDROME CORONAVIRUS 2 (SARS-COV-2) (CORONAVIRUS DISEASE [COVID-19]), AMPLIFIED PROBE TECHNIQUE, MAKING USE OF HIGH THROUGHPUT TECHNOLOGIES AS DESCRIBED BY CMS-2020-01-R: HCPCS

## 2020-11-12 PROCEDURE — 99214 OFFICE O/P EST MOD 30 MIN: CPT | Performed by: NURSE PRACTITIONER

## 2020-11-12 PROCEDURE — 99213 OFFICE O/P EST LOW 20 MIN: CPT

## 2020-11-12 ASSESSMENT — ENCOUNTER SYMPTOMS
NAUSEA: 0
VOMITING: 0
SHORTNESS OF BREATH: 0
EYE ITCHING: 0
EYE REDNESS: 0
SORE THROAT: 0
DIARRHEA: 0
SINUS PRESSURE: 0
COUGH: 0
ABDOMINAL PAIN: 0
CHEST TIGHTNESS: 0

## 2020-11-12 NOTE — ED PROVIDER NOTES
[address] Encounter      [facility]       Chief Complaint   Patient presents with    Covid Testing       Nurses Notes reviewed and I agree except as noted in the HPI. HISTORY OF PRESENT ILLNESS   Alexandre Flanagan is a 27 y.o. female who presents for COVID-19 testing. She states that several people in her office have recently been positive. At the present time of evaluation patient denies any signs or symptoms of illness. REVIEW OF SYSTEMS     Review of Systems   Constitutional: Negative for chills, fatigue and fever. HENT: Negative for congestion, ear pain, sinus pressure and sore throat. Eyes: Negative for redness and itching. Respiratory: Negative for cough, chest tightness and shortness of breath. Cardiovascular: Negative for chest pain. Gastrointestinal: Negative for abdominal pain, diarrhea, nausea and vomiting. Musculoskeletal: Negative for joint swelling and myalgias. Skin: Negative for rash. Allergic/Immunologic: Negative for environmental allergies and food allergies. Neurological: Negative for dizziness and headaches. Hematological: Negative for adenopathy. PAST MEDICAL HISTORY         Diagnosis Date    Infertility, female     Iron deficiency anemia     Macromastia     Menorrhagia        SURGICAL HISTORY     Patient  has a past surgical history that includes  section () and other surgical history (2008). CURRENT MEDICATIONS       Discharge Medication List as of 2020  9:59 AM      CONTINUE these medications which have NOT CHANGED    Details   Phentermine HCl (ADIPEX-P PO) Take by mouth dailyHistorical Med             ALLERGIES     Patient is is allergic to no known allergies. FAMILY HISTORY     Patient'sfamily history includes Asthma in her brother; High Blood Pressure in her father, mother, paternal grandmother, and sister. SOCIAL HISTORY     Patient  reports that she has never smoked.  She has DISPOSITION Decision To Discharge 11/12/2020 09:58:46 AM    Patient is to self isolate until COVID-19 test result is known. If applicable, work/school note has been provided to be off until COVID-19 test results. Follow with family provider for further work/school note needs. May take over-the-counter vitamin D, vitamin C, and zinc as directed on bottles if no contraindications. If you develop severe respiratory distress (trouble breathing) or have a medical emergency, go to your nearest emergency room or call 911. \  Physical assessment findings, diagnostic testing(s) if applicable, and vital signs reviewed with patient/patient representative. Questions answered. If applicable, patient/patient representative will be contacted upon receipt of final culture and sensitivity or other testing results when available. Any additions or changes to medications or changes the plan of care will be made at that time. Medications as directed, including OTC medications for supportive care. Education provided on medications. Differential diagnosis(s) discussed with patient/patient representative. Home care/self care instructions reviewed with patient/patient representative. Patient is to follow-up with family care provider in 2-3 days if no improvement. Patient is to go to the emergency department if symptoms worsen. Patient/patient representative is aware of care plan, questions answered, verbalizes understanding and is in agreement. Teach back method used for patient/patient representative teaching(s) and printed instructions attached to after visit summary.            Problem List Items Addressed This Visit     None      Visit Diagnoses     Encounter for laboratory testing for COVID-19 virus    -  Primary    Exposure to COVID-19 virus              PATIENT REFERRED TO:  DO Subha Segundoien [address]  [place] [address]  [phone]    Schedule an appointment as soon as possible for a visit in  days  For further evaluation. , If symptoms change/worsen, go to the [address] [place] [address] Urgent Care  [address] [facility]  [phone]    as needed, If symptoms change/worsen, go to the [address] [facility], [address], APRN - CNP  11/12/20 [id number]

## 2020-11-12 NOTE — ED TRIAGE NOTES
Patient to room requesting COVID testing. States exposure to coworker positive with COVID. Asymptomatic. Oropharyngeal COVID swab obtained. Lab notified.

## 2020-11-13 ENCOUNTER — CARE COORDINATION (OUTPATIENT)
Dept: CARE COORDINATION | Age: 30
End: 2020-11-13

## 2020-11-13 NOTE — CARE COORDINATION
Patient contacted regarding recent visit for viral symptoms. This Emily Almaguer contacted the patient by telephone to perform post discharge call. Verified name and  with patient as identifiers. Provided introduction to self, and reason for call due to viral symptoms of infection and/or exposure to COVID-19. Call within 2 business days of discharge: Yes       Patient presented to emergency department/flu clinic with complaints of viral symptoms/exposure to COVID. Patient reports symptoms are improving. Due to no new or worsening symptoms the RN CTN/ACM was not notified for escalation. Discussed exposure protocols and quarantine with CDC Guidelines What To Do If You Are Sick    Patient was given an opportunity for questions and concerns. Stay home except to get medical care    Separate yourself from other people and animals in your home    Call ahead before visiting your doctor    Wear a facemask    Cover your coughs and sneezes    Clean your hands often    Avoid sharing personal household items    Clean all high-touch surfaces everyday    Monitor your symptoms  Seek prompt medical attention if your illness is worsening (e.g., difficulty breathing). Before seeking care, call your healthcare provider and tell them that you have, or are being evaluated for, COVID-19. Put on a facemask before you enter the facility. These steps will help the healthcare provider's office to keep other people in the office or waiting room from getting infected or exposed. Ask your healthcare provider to call the local or [facility] health department. Persons who are placed under If you have a medical emergency and need to call 911, notify the dispatch personnel that you have, or are being evaluated for COVID-19. If possible, put on a facemask before emergency medical services arrive.     The patient agrees to contact the [facility] exposure line [phone], local health department [place] Department of Health: ([phone]) and PCP office for questions related to their healthcare. Author provided contact information for future reference. Patient/family/caregiver given information for [facility] and agrees to enroll yes  Patient's preferred e-mail: [email or web address]    Patient's preferred phone number: [phone]  Based on Loop alert triggers, patient will be contacted by nurse care manager for worsening symptoms. Patient was seen in the  on 11/12/20 for covid testing. Patient is self quarantined until she gets the results back. Patient educated on covid precautions and given covid hotline number. Patient agreed to sign up for get well loop.     Minor Limes CPhT  [facility]   Medication Assistance  [facility] and [employer]    (O)[phone] (Z)[phone]

## 2020-11-15 LAB — SARS-COV-2: NOT DETECTED

## 2022-09-28 ENCOUNTER — HOSPITAL ENCOUNTER (OUTPATIENT)
Age: 32
Setting detail: SPECIMEN
Discharge: HOME | End: 2022-09-28

## 2022-09-28 ENCOUNTER — OFFICE VISIT (OUTPATIENT)
Dept: PRIMARY CARE CLINIC | Age: 32
End: 2022-09-28
Payer: COMMERCIAL

## 2022-09-28 VITALS
BODY MASS INDEX: 32.57 KG/M2 | OXYGEN SATURATION: 99 % | SYSTOLIC BLOOD PRESSURE: 110 MMHG | WEIGHT: 177 LBS | DIASTOLIC BLOOD PRESSURE: 68 MMHG | HEART RATE: 64 BPM | RESPIRATION RATE: 16 BRPM | HEIGHT: 62 IN

## 2022-09-28 DIAGNOSIS — Z13.29 SCREENING FOR THYROID DISORDER: ICD-10-CM

## 2022-09-28 DIAGNOSIS — E53.8 VITAMIN B 12 DEFICIENCY: ICD-10-CM

## 2022-09-28 DIAGNOSIS — E55.9 VITAMIN D DEFICIENCY: ICD-10-CM

## 2022-09-28 DIAGNOSIS — Z76.89 ENCOUNTER TO ESTABLISH CARE: Primary | ICD-10-CM

## 2022-09-28 DIAGNOSIS — Z13.6 ENCOUNTER FOR LIPID SCREENING FOR CARDIOVASCULAR DISEASE: ICD-10-CM

## 2022-09-28 DIAGNOSIS — D50.9 IRON DEFICIENCY ANEMIA, UNSPECIFIED IRON DEFICIENCY ANEMIA TYPE: ICD-10-CM

## 2022-09-28 DIAGNOSIS — Z13.220 ENCOUNTER FOR LIPID SCREENING FOR CARDIOVASCULAR DISEASE: ICD-10-CM

## 2022-09-28 DIAGNOSIS — Z12.4 CERVICAL CANCER SCREENING: ICD-10-CM

## 2022-09-28 DIAGNOSIS — E66.09 CLASS 1 OBESITY DUE TO EXCESS CALORIES WITHOUT SERIOUS COMORBIDITY WITH BODY MASS INDEX (BMI) OF 32.0 TO 32.9 IN ADULT: ICD-10-CM

## 2022-09-28 PROBLEM — E66.811 CLASS 1 OBESITY DUE TO EXCESS CALORIES WITHOUT SERIOUS COMORBIDITY WITH BODY MASS INDEX (BMI) OF 32.0 TO 32.9 IN ADULT: Status: ACTIVE | Noted: 2022-09-28

## 2022-09-28 PROBLEM — E66.812 OBESITY, CLASS II, BMI 35-39.9: Status: ACTIVE | Noted: 2019-09-19

## 2022-09-28 PROBLEM — E66.9 OBESITY, CLASS II, BMI 35-39.9: Status: ACTIVE | Noted: 2019-09-19

## 2022-09-28 LAB
ABSOLUTE EOS #: 0.04 K/UL (ref 0–0.4)
ABSOLUTE IMMATURE GRANULOCYTE: 0 K/UL (ref 0–0.3)
ABSOLUTE LYMPH #: 1.51 K/UL (ref 1–4.8)
ABSOLUTE MONO #: 0.38 K/UL (ref 0.1–0.8)
ALBUMIN SERPL-MCNC: 4.4 G/DL (ref 3.5–5.2)
ALBUMIN/GLOBULIN RATIO: 1.4 (ref 1–2.5)
ALP BLD-CCNC: 31 U/L (ref 35–104)
ALT SERPL-CCNC: 16 U/L (ref 5–33)
ANION GAP SERPL CALCULATED.3IONS-SCNC: 12 MMOL/L (ref 9–17)
AST SERPL-CCNC: 15 U/L
BASOPHILS # BLD: 2 % (ref 0–2)
BASOPHILS ABSOLUTE: 0.08 K/UL (ref 0–0.2)
BILIRUB SERPL-MCNC: 0.9 MG/DL (ref 0.3–1.2)
BUN BLDV-MCNC: 10 MG/DL (ref 6–20)
CALCIUM SERPL-MCNC: 9.4 MG/DL (ref 8.6–10.4)
CHLORIDE BLD-SCNC: 104 MMOL/L (ref 98–107)
CO2: 21 MMOL/L (ref 20–31)
CREAT SERPL-MCNC: 0.48 MG/DL (ref 0.5–0.9)
EOSINOPHILS RELATIVE PERCENT: 1 % (ref 1–4)
FERRITIN: 2 NG/ML (ref 13–150)
FOLATE: 10.1 NG/ML
GFR AFRICAN AMERICAN: >60 ML/MIN
GFR NON-AFRICAN AMERICAN: >60 ML/MIN
GFR SERPL CREATININE-BSD FRML MDRD: ABNORMAL ML/MIN/{1.73_M2}
GLUCOSE BLD-MCNC: 100 MG/DL (ref 70–99)
HCT VFR BLD CALC: 27.4 % (ref 36.3–47.1)
HEMOGLOBIN: 7.2 G/DL (ref 11.9–15.1)
IMMATURE GRANULOCYTES: 0 %
IRON SATURATION: 4 % (ref 20–55)
IRON: 15 UG/DL (ref 37–145)
LYMPHOCYTES # BLD: 36 % (ref 24–44)
MCH RBC QN AUTO: 19.5 PG (ref 25.2–33.5)
MCHC RBC AUTO-ENTMCNC: 26.3 G/DL (ref 28.4–34.8)
MCV RBC AUTO: 74.3 FL (ref 82.6–102.9)
MONOCYTES # BLD: 9 % (ref 1–7)
MORPHOLOGY: ABNORMAL
MORPHOLOGY: ABNORMAL
NRBC AUTOMATED: 0 PER 100 WBC
PDW BLD-RTO: 18.5 % (ref 11.8–14.4)
PLATELET # BLD: 366 K/UL (ref 138–453)
PMV BLD AUTO: 10.4 FL (ref 8.1–13.5)
POTASSIUM SERPL-SCNC: 4 MMOL/L (ref 3.7–5.3)
RBC # BLD: 3.69 M/UL (ref 3.95–5.11)
SEG NEUTROPHILS: 52 % (ref 36–66)
SEGMENTED NEUTROPHILS ABSOLUTE COUNT: 2.19 K/UL (ref 1.8–7.7)
SODIUM BLD-SCNC: 137 MMOL/L (ref 135–144)
TOTAL IRON BINDING CAPACITY: 421 UG/DL (ref 250–450)
TOTAL PROTEIN: 7.5 G/DL (ref 6.4–8.3)
TSH SERPL DL<=0.05 MIU/L-ACNC: 0.97 UIU/ML (ref 0.3–5)
UNSATURATED IRON BINDING CAPACITY: 406 UG/DL (ref 112–347)
VITAMIN B-12: 451 PG/ML (ref 232–1245)
VITAMIN D 25-HYDROXY: 8.5 NG/ML
WBC # BLD: 4.2 K/UL (ref 3.5–11.3)

## 2022-09-28 PROCEDURE — 99204 OFFICE O/P NEW MOD 45 MIN: CPT | Performed by: NURSE PRACTITIONER

## 2022-09-28 SDOH — ECONOMIC STABILITY: TRANSPORTATION INSECURITY
IN THE PAST 12 MONTHS, HAS LACK OF TRANSPORTATION KEPT YOU FROM MEETINGS, WORK, OR FROM GETTING THINGS NEEDED FOR DAILY LIVING?: NO

## 2022-09-28 SDOH — ECONOMIC STABILITY: FOOD INSECURITY: WITHIN THE PAST 12 MONTHS, THE FOOD YOU BOUGHT JUST DIDN'T LAST AND YOU DIDN'T HAVE MONEY TO GET MORE.: NEVER TRUE

## 2022-09-28 SDOH — ECONOMIC STABILITY: TRANSPORTATION INSECURITY
IN THE PAST 12 MONTHS, HAS THE LACK OF TRANSPORTATION KEPT YOU FROM MEDICAL APPOINTMENTS OR FROM GETTING MEDICATIONS?: NO

## 2022-09-28 SDOH — ECONOMIC STABILITY: FOOD INSECURITY: WITHIN THE PAST 12 MONTHS, YOU WORRIED THAT YOUR FOOD WOULD RUN OUT BEFORE YOU GOT MONEY TO BUY MORE.: NEVER TRUE

## 2022-09-28 ASSESSMENT — ENCOUNTER SYMPTOMS
SHORTNESS OF BREATH: 0
CHEST TIGHTNESS: 0
SINUS PAIN: 0
SINUS PRESSURE: 0
SORE THROAT: 0
EYE REDNESS: 0
WHEEZING: 0
ABDOMINAL PAIN: 0
VOMITING: 0
COUGH: 0
EYE ITCHING: 0
EYE DISCHARGE: 0
DIARRHEA: 0
TROUBLE SWALLOWING: 0
NAUSEA: 0

## 2022-09-28 ASSESSMENT — PATIENT HEALTH QUESTIONNAIRE - PHQ9
1. LITTLE INTEREST OR PLEASURE IN DOING THINGS: 0
SUM OF ALL RESPONSES TO PHQ QUESTIONS 1-9: 0
SUM OF ALL RESPONSES TO PHQ QUESTIONS 1-9: 0
SUM OF ALL RESPONSES TO PHQ9 QUESTIONS 1 & 2: 0
SUM OF ALL RESPONSES TO PHQ QUESTIONS 1-9: 0
2. FEELING DOWN, DEPRESSED OR HOPELESS: 0
SUM OF ALL RESPONSES TO PHQ QUESTIONS 1-9: 0

## 2022-09-28 ASSESSMENT — SOCIAL DETERMINANTS OF HEALTH (SDOH): HOW HARD IS IT FOR YOU TO PAY FOR THE VERY BASICS LIKE FOOD, HOUSING, MEDICAL CARE, AND HEATING?: NOT HARD AT ALL

## 2022-09-28 NOTE — PROGRESS NOTES
[address] PRIMARY CARE  [address] [place]  [address] [address]  Dept: [phone]  Dept Fax: [phone]    Nikki Zurita is a 28 y.o. female who presents today for her medical conditions/complaintsas noted below. Nikki Zurita is c/o of Establish Care (Discuss getting iron labs and PAP scheduled)        HPI:     Pt presents to establish care. Previous pcp was in [facility]. She just moved here. Bp stable  Weight is stable    Pt presents to establish care. Recently moved to the area  Previous pcp was with Dr. Guerda caba    Concerned about weight. This is the highest her weight she has ever been. She was referred to a weight loss clinic in the past. She was started on adipex which did help. Hx of IMAN. Dx in 2016. She always was prescribed iron pills. Causes constipation and upsets her stomach. She has a son that is 5. His name is Jaspreet Arndt. In . Due for pap. Needs new gyn. No other concerns. Past Medical History:   Diagnosis Date    Infertility, female     Iron deficiency anemia     Macromastia     Menorrhagia       Past Surgical History:   Procedure Laterality Date     SECTION  2017    OTHER SURGICAL HISTORY      Keloid scar removal from ears       Family History   Problem Relation Age of Onset    High Blood Pressure Mother     High Blood Pressure Father     High Blood Pressure Sister     Asthma Brother     High Blood Pressure Paternal Grandmother        Social History     Tobacco Use    Smoking status: Never    Smokeless tobacco: Never   Substance Use Topics    Alcohol use: No      No current outpatient medications on file. No current facility-administered medications for this visit.      Allergies   Allergen Reactions    No Known Allergies        Health Maintenance   Topic Date Due    Varicella vaccine (1 of 2 - 2-dose childhood series) Never done    Hepatitis B vaccine (3 of 4 - 4-dose series) 2000 Depression Screen  Never done    Hepatitis C screen  Never done    Cervical cancer screen  Never done    Flu vaccine (1) 08/01/2022    DTaP/Tdap/Td vaccine (4 - Td or Tdap) 11/15/2028    COVID-19 Vaccine  Completed    HIV screen  Completed    Hepatitis A vaccine  Aged Out    Hib vaccine  Aged Out    Meningococcal (ACWY) vaccine  Aged Out    Pneumococcal 0-64 years Vaccine  Aged Out       :     Review of Systems   Constitutional:  Negative for chills, fatigue and fever. HENT:  Negative for ear discharge, ear pain, sinus pressure, sinus pain, sore throat and trouble swallowing. Eyes:  Negative for discharge, redness and itching. Respiratory:  Negative for cough, chest tightness, shortness of breath and wheezing. Cardiovascular:  Negative for chest pain. Gastrointestinal:  Negative for abdominal pain, diarrhea, nausea and vomiting. Genitourinary:  Negative for difficulty urinating. Musculoskeletal:  Negative for arthralgias and neck pain. Skin:  Negative for rash. Neurological:  Negative for dizziness, weakness, light-headedness and headaches. All other systems reviewed and are negative. Objective:     Physical Exam  Constitutional:       Appearance: Normal appearance. She is obese. HENT:      Head: Normocephalic and atraumatic. Nose: Nose normal.   Eyes:      Extraocular Movements: Extraocular movements intact. Conjunctiva/sclera: Conjunctivae normal.      Pupils: Pupils are equal, round, and reactive to light. Cardiovascular:      Rate and Rhythm: Normal rate and regular rhythm. Pulses: Normal pulses. Heart sounds: Normal heart sounds. Pulmonary:      Effort: Pulmonary effort is normal.      Breath sounds: Normal breath sounds. Abdominal:      General: Abdomen is flat. Palpations: Abdomen is soft. Musculoskeletal:         General: Normal range of motion. Cervical back: Neck supple. Skin:     General: Skin is warm and dry.       Capillary Refill: Capillary refill takes less than 2 seconds. Neurological:      General: No focal deficit present. Mental Status: She is alert and oriented to person, place, and time. Psychiatric:         Mood and Affect: Mood normal.     /68 (Site: Left Upper Arm, Position: Sitting, Cuff Size: Medium Adult)   Pulse 64   Resp 16   Ht 5' 2\" (1.575 m)   Wt 177 lb (80.3 kg)   SpO2 99%   BMI 32.37 kg/m²     Assessment:       Diagnosis Orders   1. Encounter to establish care        2. Encounter for screening mammogram for malignant neoplasm of breast        3. Cervical cancer screening  [place] [place]      4. Vitamin D deficiency  Vitamin D 25 Hydroxy      5. Screening for thyroid disorder  TSH with Reflex      6. Encounter for lipid screening for cardiovascular disease  Comprehensive Metabolic Panel      7. Vitamin B 12 deficiency  Vitamin B12 & Folate      8. Iron deficiency anemia, unspecified iron deficiency anemia type  CBC with Auto Differential    Iron and TIBC    Ferritin          :      Return in about 1 week (around 10/5/2022) for physical .  1.  Encounter establish care  Reviewed previous encounters and lab work  2 cervical cancer screening  Referral to gynecology  3-6. Screening  Rx for lab work  7. Iron deficiency anemia  Plan to check lab work. If iron levels are abnormal will refer for iron transfusions  8. Obesity  Plan check lab work.   We will have her follow-up in 1 to 2 weeks for a physical and will discuss Adipex  Discussed dietary counseling    F/u in 1-2 weeks      Orders Placed This Encounter   Procedures    CBC with Auto Differential     Standing Status:   Future     Standing Expiration Date:   9/28/2023    TSH with Reflex     Standing Status:   Future     Standing Expiration Date:   9/28/2023    Comprehensive Metabolic Panel     Standing Status:   Future     Standing Expiration Date:   9/28/2023    Vitamin B12 & Folate     Standing Status: Future     Standing Expiration Date:   9/28/2023    Vitamin D 25 Hydroxy     Standing Status:   Future     Standing Expiration Date:   9/28/2023    Iron and TIBC     Standing Status:   Future     Standing Expiration Date:   9/28/2023    Ferritin     Standing Status:   Future     Standing Expiration Date:   9/28/2023    [facility], Jina Hinson, [place], Gynecology, [place]     Referral Priority:   Routine     Referral Type:   Eval and Treat     Referral Reason:   Specialty Services Required     Referred to Provider:   SALIAM Cortez CNP     Requested Specialty:   [facility] Nurse Practitioner     Number of Visits Requested:   1     No orders of the defined types were placed in this encounter. Patient given educational materials - seepatient instructions. Discussed use, benefit, and side effects of prescribed medications. All patient questions answered. Pt voiced understanding. Reviewed health maintenance. Instructed to continue current medications, diet and exercise. Patient agreedwith treatment plan. Follow up as directed.       Electronically signed by SALIMA Lloyd CNP on 9/28/2022at 8:59 AM

## 2022-09-29 ENCOUNTER — TELEPHONE (OUTPATIENT)
Dept: ONCOLOGY | Age: 32
End: 2022-09-29

## 2022-09-29 DIAGNOSIS — D64.9 LOW HEMOGLOBIN: Primary | ICD-10-CM

## 2022-09-29 DIAGNOSIS — D50.8 OTHER IRON DEFICIENCY ANEMIA: ICD-10-CM

## 2022-09-29 NOTE — TELEPHONE ENCOUNTER
BALBINAM for pt to call and schedule a consult appt with Dr. Dmitry Phan    Electronically signed by Mayela Marshall on 9/29/2022 at 9:46 AM

## 2022-10-06 ENCOUNTER — OFFICE VISIT (OUTPATIENT)
Dept: PRIMARY CARE CLINIC | Age: 32
End: 2022-10-06
Payer: COMMERCIAL

## 2022-10-06 VITALS
OXYGEN SATURATION: 100 % | HEIGHT: 62 IN | WEIGHT: 178 LBS | DIASTOLIC BLOOD PRESSURE: 70 MMHG | SYSTOLIC BLOOD PRESSURE: 120 MMHG | HEART RATE: 78 BPM | BODY MASS INDEX: 32.76 KG/M2 | RESPIRATION RATE: 12 BRPM

## 2022-10-06 DIAGNOSIS — Z00.00 ENCOUNTER FOR WELL ADULT EXAM WITHOUT ABNORMAL FINDINGS: Primary | ICD-10-CM

## 2022-10-06 DIAGNOSIS — D64.9 LOW HEMOGLOBIN: ICD-10-CM

## 2022-10-06 DIAGNOSIS — E55.9 VITAMIN D DEFICIENCY: ICD-10-CM

## 2022-10-06 DIAGNOSIS — Z23 NEED FOR INFLUENZA VACCINATION: ICD-10-CM

## 2022-10-06 DIAGNOSIS — E66.9 OBESITY, CLASS II, BMI 35-39.9: ICD-10-CM

## 2022-10-06 DIAGNOSIS — D50.8 OTHER IRON DEFICIENCY ANEMIA: ICD-10-CM

## 2022-10-06 PROCEDURE — 99395 PREV VISIT EST AGE 18-39: CPT | Performed by: NURSE PRACTITIONER

## 2022-10-06 PROCEDURE — 90471 IMMUNIZATION ADMIN: CPT | Performed by: NURSE PRACTITIONER

## 2022-10-06 PROCEDURE — 90674 CCIIV4 VAC NO PRSV 0.5 ML IM: CPT | Performed by: NURSE PRACTITIONER

## 2022-10-06 RX ORDER — ERGOCALCIFEROL 1.25 MG/1
50000 CAPSULE ORAL WEEKLY
Qty: 12 CAPSULE | Refills: 1 | Status: SHIPPED | OUTPATIENT
Start: 2022-10-06

## 2022-10-06 RX ORDER — PHENTERMINE HYDROCHLORIDE 37.5 MG/1
37.5 TABLET ORAL
Qty: 30 TABLET | Refills: 0 | Status: SHIPPED | OUTPATIENT
Start: 2022-10-06 | End: 2022-11-05

## 2022-10-06 ASSESSMENT — ENCOUNTER SYMPTOMS
EYE ITCHING: 0
SHORTNESS OF BREATH: 0
EYE DISCHARGE: 0
SORE THROAT: 0
DIARRHEA: 0
CHEST TIGHTNESS: 0
WHEEZING: 0
SINUS PAIN: 0
COUGH: 0
SINUS PRESSURE: 0
TROUBLE SWALLOWING: 0
VOMITING: 0
EYE REDNESS: 0
ABDOMINAL PAIN: 0
NAUSEA: 0

## 2022-10-06 ASSESSMENT — PATIENT HEALTH QUESTIONNAIRE - PHQ9
SUM OF ALL RESPONSES TO PHQ QUESTIONS 1-9: 0
1. LITTLE INTEREST OR PLEASURE IN DOING THINGS: 0
SUM OF ALL RESPONSES TO PHQ QUESTIONS 1-9: 0
SUM OF ALL RESPONSES TO PHQ QUESTIONS 1-9: 0
SUM OF ALL RESPONSES TO PHQ9 QUESTIONS 1 & 2: 0
SUM OF ALL RESPONSES TO PHQ QUESTIONS 1-9: 0
2. FEELING DOWN, DEPRESSED OR HOPELESS: 0

## 2022-10-06 NOTE — PROGRESS NOTES
Well Adult Note  Name: Madai Wynn Date: 10/6/2022   MRN: [id number] Sex: Female   Age: 28 y.o. Ethnicity: Non- / Non    : 1990 Race: Lynsey Esparza / Melony Ramon is here for well adult exam.  History:    Patient presents for her physical  Blood pressure stable  Weight is stable    Patient presents for her physical.  She had her lab work done beforehand. No acute complaints today  Would like her flu shot      Review of Systems   Constitutional:  Positive for fatigue. Negative for chills and fever. HENT:  Negative for ear discharge, ear pain, sinus pressure, sinus pain, sore throat and trouble swallowing. Eyes:  Negative for discharge, redness and itching. Respiratory:  Negative for cough, chest tightness, shortness of breath and wheezing. Cardiovascular:  Negative for chest pain. Gastrointestinal:  Negative for abdominal pain, diarrhea, nausea and vomiting. Genitourinary:  Negative for difficulty urinating. Musculoskeletal:  Negative for arthralgias and neck pain. Skin:  Negative for rash. Neurological:  Negative for dizziness, weakness, light-headedness and headaches. All other systems reviewed and are negative. Allergies   Allergen Reactions    No Known Allergies          Prior to Visit Medications    Medication Sig Taking? Authorizing Provider   vitamin D (ERGOCALCIFEROL) 1.25 MG (12838 UT) CAPS capsule Take 1 capsule by mouth once a week Yes SALIMA Campa CNP   phentermine (ADIPEX-P) 37.5 MG tablet Take 1 tablet by mouth every morning (before breakfast) for 30 days.  Yes SALIMA Campa CNP         Past Medical History:   Diagnosis Date    Infertility, female     Iron deficiency anemia     Macromastia     Menorrhagia        Past Surgical History:   Procedure Laterality Date     SECTION  2017    OTHER SURGICAL HISTORY      Keloid scar removal from ears         Family History   Problem Relation Age of Onset High Blood Pressure Mother     High Blood Pressure Father     High Blood Pressure Sister     Asthma Brother     High Blood Pressure Paternal Grandmother        Social History     Tobacco Use    Smoking status: Never    Smokeless tobacco: Never   Vaping Use    Vaping Use: Never used   Substance Use Topics    Alcohol use: No    Drug use: No       Objective   /70   Pulse 78   Resp 12   Ht 5' 2\" (1.575 m)   Wt 178 lb (80.7 kg)   SpO2 100%   BMI 32.56 kg/m²   Wt Readings from Last 3 Encounters:   10/06/22 178 lb (80.7 kg)   09/28/22 177 lb (80.3 kg)   11/12/20 150 lb (68 kg)     There were no vitals filed for this visit. Physical Exam  Constitutional:       Appearance: Normal appearance. She is obese. HENT:      Head: Normocephalic and atraumatic. Nose: Nose normal.   Eyes:      Extraocular Movements: Extraocular movements intact. Conjunctiva/sclera: Conjunctivae normal.      Pupils: Pupils are equal, round, and reactive to light. Cardiovascular:      Rate and Rhythm: Normal rate and regular rhythm. Pulses: Normal pulses. Heart sounds: Normal heart sounds. Pulmonary:      Effort: Pulmonary effort is normal.      Breath sounds: Normal breath sounds. Abdominal:      General: Abdomen is flat. Palpations: Abdomen is soft. Musculoskeletal:         General: Normal range of motion. Cervical back: Neck supple. Skin:     General: Skin is warm and dry. Capillary Refill: Capillary refill takes less than 2 seconds. Neurological:      General: No focal deficit present. Mental Status: She is alert and oriented to person, place, and time. Psychiatric:         Mood and Affect: Mood normal.         Assessment   Plan   1. Encounter for well adult exam without abnormal findings  Reviewed and discussed lab work in detail  2. Need for influenza vaccination  -     Influenza, FLUCELVAX, (age 10 mo+), IM, Preservative Free, 0.5 mL  3.  Obesity, Class II, BMI 35-39.9  - phentermine (ADIPEX-P) 37.5 MG tablet; Take 1 tablet by mouth every morning (before breakfast) for 30 days. , Disp-30 tablet, R-0Normal  Discussed dietary counseling  4. Other iron deficiency anemia  We will try to order Injectafer and get iron transfusions started for her  She does not tolerate oral iron as it upsets her stomach and causes constipation  5. Low hemoglobin  Referral to hematology placed. She has an appointment within the month    Patient is going to follow-up in 1 month         Personalized Preventive Plan   Current Health Maintenance Status  Immunization History   Administered Date(s) Administered    COVID-19, [address], Primary or Immunocompromised, (age 12y+), IM, 100 mcg/0.5mL 08/10/2021, 09/07/2021, 03/16/2022    Hepatitis B 08/18/1996, 09/13/2000    Influenza Virus Vaccine 11/27/2012, 11/21/2013, 11/12/2015, 09/29/2016, 10/03/2019    Influenza, FLUARIX, FLULAVAL, Constance Leahye (age 10 mo+) AND AFLURIA, (age 1 y+), PF, 0.5mL 04/26/2022    Influenza, FLUCELVAX, (age 10 mo+), MDCK, PF, 0.5mL 10/06/2022    Influenza, Quadv, 6 mo and older, IM, PF (Flulaval, Fluarix) 12/04/2018    MMR 01/09/1992, 09/07/1994    Tdap (Boostrix, Adacel) 11/27/2012, 02/10/2017, 11/15/2018        Health Maintenance   Topic Date Due    Varicella vaccine (1 of 2 - 2-dose childhood series) Never done    Hepatitis B vaccine (3 of 4 - 4-dose series) 09/13/2000    Hepatitis C screen  Never done    Cervical cancer screen  Never done    Depression Screen  10/06/2023    DTaP/Tdap/Td vaccine (4 - Td or Tdap) 11/15/2028    Flu vaccine  Completed    COVID-19 Vaccine  Completed    HIV screen  Completed    Hepatitis A vaccine  Aged Out    Hib vaccine  Aged Out    Meningococcal (ACWY) vaccine  Aged Out    Pneumococcal 0-64 years Vaccine  Aged Out     Recommendations for [employer] Due: see orders and patient instructions/AVS.    Return in about 4 weeks (around 11/3/2022) for adipex .

## 2022-10-06 NOTE — PROGRESS NOTES
After obtaining consent, and per orders of Alessandra Calderon CNP, injection of Flu vaccine given in Right deltoid by Hammad Morris MA. Patient instructed to remain in clinic for 20 minutes afterwards, and to report any adverse reaction to me immediately.

## 2022-10-12 RX ORDER — ACETAMINOPHEN 325 MG/1
650 TABLET ORAL
Status: CANCELLED | OUTPATIENT
Start: 2022-10-12

## 2022-10-12 RX ORDER — ONDANSETRON 2 MG/ML
8 INJECTION INTRAMUSCULAR; INTRAVENOUS
Status: CANCELLED | OUTPATIENT
Start: 2022-10-12

## 2022-10-12 RX ORDER — SODIUM CHLORIDE 9 MG/ML
5-250 INJECTION, SOLUTION INTRAVENOUS PRN
Status: CANCELLED | OUTPATIENT
Start: 2022-10-12

## 2022-10-12 RX ORDER — DIPHENHYDRAMINE HYDROCHLORIDE 50 MG/ML
50 INJECTION INTRAMUSCULAR; INTRAVENOUS
Status: CANCELLED | OUTPATIENT
Start: 2022-10-12

## 2022-10-12 RX ORDER — EPINEPHRINE 1 MG/ML
0.3 INJECTION, SOLUTION, CONCENTRATE INTRAVENOUS PRN
Status: CANCELLED | OUTPATIENT
Start: 2022-10-12

## 2022-10-12 RX ORDER — SODIUM CHLORIDE 9 MG/ML
INJECTION, SOLUTION INTRAVENOUS CONTINUOUS
Status: CANCELLED | OUTPATIENT
Start: 2022-10-12

## 2022-10-12 RX ORDER — SODIUM CHLORIDE 0.9 % (FLUSH) 0.9 %
5-40 SYRINGE (ML) INJECTION PRN
Status: CANCELLED | OUTPATIENT
Start: 2022-10-12

## 2022-10-12 RX ORDER — HEPARIN SODIUM (PORCINE) LOCK FLUSH IV SOLN 100 UNIT/ML 100 UNIT/ML
500 SOLUTION INTRAVENOUS PRN
Status: CANCELLED | OUTPATIENT
Start: 2022-10-12

## 2022-10-12 RX ORDER — ALBUTEROL SULFATE 90 UG/1
4 AEROSOL, METERED RESPIRATORY (INHALATION) PRN
Status: CANCELLED | OUTPATIENT
Start: 2022-10-12

## 2022-10-13 ENCOUNTER — TELEPHONE (OUTPATIENT)
Dept: PRIMARY CARE CLINIC | Age: 32
End: 2022-10-13

## 2022-10-13 NOTE — TELEPHONE ENCOUNTER
Isael from [facility] [employer] called, received order but requesting medication to be changed to one of the preferred: Ferrlecit                                                           Infjeannine Figueredo  Please place new order in epic and she will watch for it.

## 2022-10-18 RX ORDER — DIPHENHYDRAMINE HYDROCHLORIDE 50 MG/ML
50 INJECTION INTRAMUSCULAR; INTRAVENOUS
Status: CANCELLED | OUTPATIENT
Start: 2022-10-18

## 2022-10-18 RX ORDER — EPINEPHRINE 1 MG/ML
0.3 INJECTION, SOLUTION, CONCENTRATE INTRAVENOUS PRN
Status: CANCELLED | OUTPATIENT
Start: 2022-10-18

## 2022-10-18 RX ORDER — ONDANSETRON 2 MG/ML
8 INJECTION INTRAMUSCULAR; INTRAVENOUS
Status: CANCELLED | OUTPATIENT
Start: 2022-10-18

## 2022-10-18 RX ORDER — ACETAMINOPHEN 325 MG/1
650 TABLET ORAL
Status: CANCELLED | OUTPATIENT
Start: 2022-10-18

## 2022-10-18 RX ORDER — SODIUM CHLORIDE 9 MG/ML
5-250 INJECTION, SOLUTION INTRAVENOUS PRN
Status: CANCELLED | OUTPATIENT
Start: 2022-10-18

## 2022-10-18 RX ORDER — HEPARIN SODIUM (PORCINE) LOCK FLUSH IV SOLN 100 UNIT/ML 100 UNIT/ML
500 SOLUTION INTRAVENOUS PRN
Status: CANCELLED | OUTPATIENT
Start: 2022-10-18

## 2022-10-18 RX ORDER — ALBUTEROL SULFATE 90 UG/1
4 AEROSOL, METERED RESPIRATORY (INHALATION) PRN
Status: CANCELLED | OUTPATIENT
Start: 2022-10-18

## 2022-10-18 RX ORDER — SODIUM CHLORIDE 0.9 % (FLUSH) 0.9 %
5-40 SYRINGE (ML) INJECTION PRN
Status: CANCELLED | OUTPATIENT
Start: 2022-10-18

## 2022-10-18 RX ORDER — SODIUM CHLORIDE 9 MG/ML
INJECTION, SOLUTION INTRAVENOUS CONTINUOUS
Status: CANCELLED | OUTPATIENT
Start: 2022-10-18

## 2022-10-19 ENCOUNTER — TELEPHONE (OUTPATIENT)
Dept: ONCOLOGY | Age: 32
End: 2022-10-19

## 2022-10-27 ENCOUNTER — HOSPITAL ENCOUNTER (OUTPATIENT)
Dept: INFUSION THERAPY | Age: 32
Discharge: HOME | End: 2022-10-27
Payer: COMMERCIAL

## 2022-10-27 VITALS
RESPIRATION RATE: 16 BRPM | TEMPERATURE: 98.6 F | DIASTOLIC BLOOD PRESSURE: 83 MMHG | HEART RATE: 76 BPM | SYSTOLIC BLOOD PRESSURE: 133 MMHG

## 2022-10-27 DIAGNOSIS — D50.9 IRON DEFICIENCY ANEMIA, UNSPECIFIED IRON DEFICIENCY ANEMIA TYPE: Primary | ICD-10-CM

## 2022-10-27 PROCEDURE — 6360000002 HC RX W HCPCS: Performed by: NURSE PRACTITIONER

## 2022-10-27 PROCEDURE — 96365 THER/PROPH/DIAG IV INF INIT: CPT

## 2022-10-27 PROCEDURE — 2580000003 HC RX 258: Performed by: NURSE PRACTITIONER

## 2022-10-27 RX ORDER — ALBUTEROL SULFATE 90 UG/1
4 AEROSOL, METERED RESPIRATORY (INHALATION) PRN
Status: CANCELLED | OUTPATIENT
Start: 2022-11-03

## 2022-10-27 RX ORDER — SODIUM CHLORIDE 9 MG/ML
5-250 INJECTION, SOLUTION INTRAVENOUS PRN
Status: CANCELLED | OUTPATIENT
Start: 2022-11-03

## 2022-10-27 RX ORDER — ACETAMINOPHEN 325 MG/1
650 TABLET ORAL
Status: CANCELLED | OUTPATIENT
Start: 2022-11-03

## 2022-10-27 RX ORDER — SODIUM CHLORIDE 9 MG/ML
INJECTION, SOLUTION INTRAVENOUS CONTINUOUS
Status: ACTIVE | OUTPATIENT
Start: 2022-10-27 | End: 2022-10-27

## 2022-10-27 RX ORDER — EPINEPHRINE 1 MG/ML
0.3 INJECTION, SOLUTION, CONCENTRATE INTRAVENOUS PRN
Status: CANCELLED | OUTPATIENT
Start: 2022-11-03

## 2022-10-27 RX ORDER — MEPERIDINE HYDROCHLORIDE 50 MG/ML
12.5 INJECTION INTRAMUSCULAR; INTRAVENOUS; SUBCUTANEOUS PRN
Status: CANCELLED | OUTPATIENT
Start: 2022-11-03

## 2022-10-27 RX ORDER — SODIUM CHLORIDE 0.9 % (FLUSH) 0.9 %
5-40 SYRINGE (ML) INJECTION PRN
Status: CANCELLED | OUTPATIENT
Start: 2022-11-03

## 2022-10-27 RX ORDER — HEPARIN SODIUM (PORCINE) LOCK FLUSH IV SOLN 100 UNIT/ML 100 UNIT/ML
500 SOLUTION INTRAVENOUS PRN
Status: CANCELLED | OUTPATIENT
Start: 2022-11-03

## 2022-10-27 RX ORDER — SODIUM CHLORIDE 9 MG/ML
INJECTION, SOLUTION INTRAVENOUS CONTINUOUS
Status: CANCELLED | OUTPATIENT
Start: 2022-11-03

## 2022-10-27 RX ORDER — DIPHENHYDRAMINE HYDROCHLORIDE 50 MG/ML
50 INJECTION INTRAMUSCULAR; INTRAVENOUS
Status: CANCELLED | OUTPATIENT
Start: 2022-11-03

## 2022-10-27 RX ORDER — FAMOTIDINE 10 MG/ML
20 INJECTION, SOLUTION INTRAVENOUS
Status: CANCELLED | OUTPATIENT
Start: 2022-11-03

## 2022-10-27 RX ORDER — ONDANSETRON 2 MG/ML
8 INJECTION INTRAMUSCULAR; INTRAVENOUS
Status: CANCELLED | OUTPATIENT
Start: 2022-11-03

## 2022-10-27 RX ADMIN — IRON SUCROSE 200 MG: 20 INJECTION, SOLUTION INTRAVENOUS at 10:00

## 2022-10-27 RX ADMIN — SODIUM CHLORIDE: 9 INJECTION, SOLUTION INTRAVENOUS at 09:58

## 2022-10-27 NOTE — PROGRESS NOTES
Patient arrived for venofer 1/5. Infusion completed without issue, pt monitored for 30 min post infusion. Pt stable at discharge. Scheduled to return 11/3/22 venofer 2/5.

## 2022-10-28 ENCOUNTER — INITIAL CONSULT (OUTPATIENT)
Dept: ONCOLOGY | Age: 32
End: 2022-10-28
Payer: COMMERCIAL

## 2022-10-28 ENCOUNTER — TELEPHONE (OUTPATIENT)
Dept: ONCOLOGY | Age: 32
End: 2022-10-28

## 2022-10-28 VITALS
WEIGHT: 168 LBS | BODY MASS INDEX: 30.73 KG/M2 | TEMPERATURE: 97.5 F | SYSTOLIC BLOOD PRESSURE: 109 MMHG | DIASTOLIC BLOOD PRESSURE: 73 MMHG | HEART RATE: 76 BPM

## 2022-10-28 DIAGNOSIS — D50.9 IRON DEFICIENCY ANEMIA, UNSPECIFIED IRON DEFICIENCY ANEMIA TYPE: Primary | ICD-10-CM

## 2022-10-28 PROCEDURE — 99202 OFFICE O/P NEW SF 15 MIN: CPT | Performed by: INTERNAL MEDICINE

## 2022-10-28 PROCEDURE — 99204 OFFICE O/P NEW MOD 45 MIN: CPT | Performed by: INTERNAL MEDICINE

## 2022-10-28 NOTE — PROGRESS NOTES
I                                                                                          _           Ms. Aaron Quezada is a very pleasant 28 y.o. female with history of multiple co morbidities as listed. Patient is referred for further management of iron deficiency anemia. Patient had history of in vitro fertilization at [address] [place] [address],[address] 6 years ago. At that time she was noted to have iron deficiency anemia and she was treated with oral iron. She could not tolerate the treatment with significant GI toxicity. Patient continued to have heavy menses. She had increasing weakness and fatigue. She had worsening anemia. She denies any other source of active bleeding. No melena or hematochezia. No hematemesis. Patient is craving ice. She has shortness of breath on exertion. She was started on Venofer weekly for 5 doses which was started yesterday. No history of smoking. Social alcohol drinking. Dale Sal PAST MEDICAL HISTORY: has a past medical history of Infertility, female, Iron deficiency anemia, Macromastia, and Menorrhagia. PAST SURGICAL HISTORY: has a past surgical history that includes  section () and other surgical history (). CURRENT MEDICATIONS:  has a current medication list which includes the following prescription(s): vitamin d and phentermine, and the following Facility-Administered Medications: sodium chloride. ALLERGIES:  is allergic to no known allergies. FAMILY HISTORY: Negative for any hematological or oncological conditions. SOCIAL HISTORY:  reports that she has never smoked. She has never used smokeless tobacco. She reports that she does not drink alcohol and does not use drugs. REVIEW OF SYSTEMS:     General: Positive for weakness and fatigue. No unanticipated weight loss or decreased appetite. No fever or chills. Eyes: No blurred vision, eye pain or double vision. Ears: No hearing problems or drainage. No tinnitus.    Throat: No sore throat, problems with swallowing or dysphagia. Respiratory: No cough, sputum or hemoptysis. No shortness of breath. No pleuritic chest pain. Cardiovascular: No chest pain, orthopnea or PND. No lower extremity edema. No palpitation. Gastrointestinal: No problems with swallowing. No abdominal pain or bloating. No nausea or vomiting. No diarrhea or constipation. No GI bleeding. Genitourinary: No dysuria, hematuria, frequency or urgency. Musculoskeletal: No muscle aches or pains. No limitation of movement. No back pain. No gait disturbance, No joint complaints. Dermatologic: No skin rashes or pruritus. No skin lesions or discolorations. Psychiatric: No depression, anxiety, or stress or signs of schizophrenia. No change in mood or affect. Hematologic: No history of bleeding tendency. No bruises or ecchymosis. No history of clotting problems. Infectious disease: No fever, chills or frequent infections. Endocrine: No polydipsia or polyuria. No temperature intolerance. Neurologic: No headaches or dizziness. No weakness or numbness of the extremities. No changes in balance, coordination,  memory, mentation, behavior. Allergic/Immunologic: No nasal congestion or hives. No repeated infections. PHYSICAL EXAM:  The patient is not in acute distress. Vital signs: Blood pressure 109/73, pulse 76, temperature 97.5 °F (36.4 °C), temperature source Temporal, weight 168 lb (76.2 kg).      General appearance - well appearing, not in pain or distress  Mental status - good mood, alert and oriented  Eyes - pupils equal and reactive, extraocular eye movements intact  Ears - bilateral TM's and external ear canals normal  Nose - normal and patent, no erythema, discharge or polyps  Mouth - mucous membranes moist, pharynx normal without lesions  Neck - supple, no significant adenopathy  Lymphatics - no palpable lymphadenopathy, no hepatosplenomegaly  Chest - clear to auscultation, no wheezes, rales or rhonchi, symmetric air entry  Heart - normal rate, regular rhythm, normal S1, S2, no murmurs, rubs, clicks or gallops  Abdomen - soft, nontender, nondistended, no masses or organomegaly  Neurological - alert, oriented, normal speech, no focal findings or movement disorder noted  Musculoskeletal - no joint tenderness, deformity or swelling  Extremities - peripheral pulses normal, no pedal edema, no clubbing or cyanosis  Skin - normal coloration and turgor, no rashes, no suspicious skin lesions noted     Review of Diagnostic data:   Lab Results   Component Value Date    WBC 4.2 09/28/2022    HGB 7.2 (L) 09/28/2022    HCT 27.4 (L) 09/28/2022    MCV 74.3 (L) 09/28/2022     09/28/2022       Chemistry        Component Value Date/Time     09/28/2022 0851    K 4.0 09/28/2022 0851     09/28/2022 0851    CO2 21 09/28/2022 0851    BUN 10 09/28/2022 0851    CREATININE 0.48 (L) 09/28/2022 0851        Component Value Date/Time    CALCIUM 9.4 09/28/2022 0851    ALKPHOS 31 (L) 09/28/2022 0851    AST 15 09/28/2022 0851    ALT 16 09/28/2022 0851    BILITOT 0.9 09/28/2022 0851          @[facility]@      IMPRESSION:   Iron deficiency anemia  Intolerable side effects to oral iron  Heavy menses    PLAN: Records, labs and images were reviewed and discussed with the patient. I explained to the patient the nature of this problem with iron deficiency anemia and possible underlying causes and management plan. Obviously patient is having symptomatic iron deficiency anemia related to heavy menses. She could not tolerate the oral iron. I agree with the iron infusion using Venofer. Started yesterday. We will monitor response to treatment. I expect full recovery in 6 to 8 weeks. I explained to the patient the possible need for repeated iron infusion due to the menorrhagia and heavy menses. She will follow-up with her gynecologist as well. We will monitor labs and interfere as needed.   Patient's questions were answered to the best of her satisfaction and she verbalized full understanding and agreement.

## 2022-10-28 NOTE — TELEPHONE ENCOUNTER
AVS from 10/28/22    RV 3-4 with CBC, iron studies before RV    *rv is sched for [id number]  *pt will have labs done 1 week prior to rv   Cbc,ferritin,iron,tibc      PT was given AVS and appt schedule

## 2022-11-03 ENCOUNTER — HOSPITAL ENCOUNTER (OUTPATIENT)
Dept: INFUSION THERAPY | Age: 32
Discharge: HOME | End: 2022-11-03
Payer: COMMERCIAL

## 2022-11-03 VITALS
RESPIRATION RATE: 18 BRPM | TEMPERATURE: 98.6 F | HEART RATE: 78 BPM | DIASTOLIC BLOOD PRESSURE: 72 MMHG | SYSTOLIC BLOOD PRESSURE: 109 MMHG

## 2022-11-03 DIAGNOSIS — D50.9 IRON DEFICIENCY ANEMIA, UNSPECIFIED IRON DEFICIENCY ANEMIA TYPE: Primary | ICD-10-CM

## 2022-11-03 PROCEDURE — 96365 THER/PROPH/DIAG IV INF INIT: CPT

## 2022-11-03 PROCEDURE — 2580000003 HC RX 258: Performed by: NURSE PRACTITIONER

## 2022-11-03 PROCEDURE — 6360000002 HC RX W HCPCS: Performed by: NURSE PRACTITIONER

## 2022-11-03 RX ORDER — ALBUTEROL SULFATE 90 UG/1
4 AEROSOL, METERED RESPIRATORY (INHALATION) PRN
Status: CANCELLED | OUTPATIENT
Start: 2022-11-10

## 2022-11-03 RX ORDER — DIPHENHYDRAMINE HYDROCHLORIDE 50 MG/ML
50 INJECTION INTRAMUSCULAR; INTRAVENOUS
Status: CANCELLED | OUTPATIENT
Start: 2022-11-10

## 2022-11-03 RX ORDER — MEPERIDINE HYDROCHLORIDE 50 MG/ML
12.5 INJECTION INTRAMUSCULAR; INTRAVENOUS; SUBCUTANEOUS PRN
Status: CANCELLED | OUTPATIENT
Start: 2022-11-10

## 2022-11-03 RX ORDER — SODIUM CHLORIDE 9 MG/ML
INJECTION, SOLUTION INTRAVENOUS CONTINUOUS
Status: DISCONTINUED | OUTPATIENT
Start: 2022-11-03 | End: 2022-11-04 | Stop reason: HOSPADM

## 2022-11-03 RX ORDER — ONDANSETRON 2 MG/ML
8 INJECTION INTRAMUSCULAR; INTRAVENOUS
Status: CANCELLED | OUTPATIENT
Start: 2022-11-10

## 2022-11-03 RX ORDER — SODIUM CHLORIDE 9 MG/ML
5-250 INJECTION, SOLUTION INTRAVENOUS PRN
Status: CANCELLED | OUTPATIENT
Start: 2022-11-10

## 2022-11-03 RX ORDER — SODIUM CHLORIDE 0.9 % (FLUSH) 0.9 %
5-40 SYRINGE (ML) INJECTION PRN
Status: CANCELLED | OUTPATIENT
Start: 2022-11-10

## 2022-11-03 RX ORDER — SODIUM CHLORIDE 9 MG/ML
INJECTION, SOLUTION INTRAVENOUS CONTINUOUS
Status: CANCELLED | OUTPATIENT
Start: 2022-11-10

## 2022-11-03 RX ORDER — EPINEPHRINE 1 MG/ML
0.3 INJECTION, SOLUTION, CONCENTRATE INTRAVENOUS PRN
Status: CANCELLED | OUTPATIENT
Start: 2022-11-10

## 2022-11-03 RX ORDER — HEPARIN SODIUM (PORCINE) LOCK FLUSH IV SOLN 100 UNIT/ML 100 UNIT/ML
500 SOLUTION INTRAVENOUS PRN
Status: CANCELLED | OUTPATIENT
Start: 2022-11-10

## 2022-11-03 RX ORDER — ACETAMINOPHEN 325 MG/1
650 TABLET ORAL
Status: CANCELLED | OUTPATIENT
Start: 2022-11-10

## 2022-11-03 RX ORDER — FAMOTIDINE 10 MG/ML
20 INJECTION, SOLUTION INTRAVENOUS
Status: CANCELLED | OUTPATIENT
Start: 2022-11-10

## 2022-11-03 RX ADMIN — IRON SUCROSE 200 MG: 20 INJECTION, SOLUTION INTRAVENOUS at 14:23

## 2022-11-03 RX ADMIN — SODIUM CHLORIDE: 9 INJECTION, SOLUTION INTRAVENOUS at 14:22

## 2022-11-03 NOTE — PROGRESS NOTES
Patient here for venofer 2 of 5. Arrives ambulatory. Denies any complaints. Infusion complete without incident. 30 minute post observation complete. Discharged in stable condition. Returns Friday 11/11/2022 for venofer 3 of 5.

## 2022-11-07 ENCOUNTER — OFFICE VISIT (OUTPATIENT)
Dept: PRIMARY CARE CLINIC | Age: 32
End: 2022-11-07
Payer: COMMERCIAL

## 2022-11-07 VITALS
WEIGHT: 166.8 LBS | DIASTOLIC BLOOD PRESSURE: 68 MMHG | HEART RATE: 74 BPM | RESPIRATION RATE: 12 BRPM | OXYGEN SATURATION: 100 % | BODY MASS INDEX: 30.51 KG/M2 | SYSTOLIC BLOOD PRESSURE: 110 MMHG

## 2022-11-07 DIAGNOSIS — D50.8 OTHER IRON DEFICIENCY ANEMIA: Primary | ICD-10-CM

## 2022-11-07 DIAGNOSIS — E66.9 OBESITY, CLASS II, BMI 35-39.9: ICD-10-CM

## 2022-11-07 PROCEDURE — 99213 OFFICE O/P EST LOW 20 MIN: CPT | Performed by: NURSE PRACTITIONER

## 2022-11-07 RX ORDER — PHENTERMINE HYDROCHLORIDE 37.5 MG/1
37.5 TABLET ORAL
Qty: 30 TABLET | Refills: 0 | Status: SHIPPED | OUTPATIENT
Start: 2022-11-07 | End: 2022-12-07

## 2022-11-07 NOTE — PROGRESS NOTES
[facility]  [facility] [address] [facility] [address]  [address] [address]  Dept: [phone]  Dept Fax: [phone]    Yusra Galvan is a 28 y.o. female who presents today for her medical conditions/complaintsas noted below. Yusra Galvan is c/o of Weight Management (Adipex f/u)        HPI:     Patient presents for 1 month follow-up  Blood pressure stable  Weight is down 12 pounds since last appointment    Patient presents for 1 month follow-up for Adipex. She is able to lose 12 pounds. She is changing her diet and has been exercising doing various exercise to include yoga and Racheal. She does complain of a dry mouth but no other side effects    She has started iron transfusions. She is following up with hematology. They are going to follow-up and lab work. He thinks that her iron transfusion may be somewhat related to her menstrual cycle. She may have to get iron transfusion done every 3 to 6 months. He is going to follow along    No other concerns today        Past Medical History:   Diagnosis Date    Infertility, female     Iron deficiency anemia     Macromastia     Menorrhagia       Past Surgical History:   Procedure Laterality Date     SECTION  2017    OTHER SURGICAL HISTORY      Keloid scar removal from ears       Family History   Problem Relation Age of Onset    High Blood Pressure Mother     High Blood Pressure Father     High Blood Pressure Sister     Asthma Brother     High Blood Pressure Paternal Grandmother        Social History     Tobacco Use    Smoking status: Never    Smokeless tobacco: Never   Substance Use Topics    Alcohol use: No      Current Outpatient Medications   Medication Sig Dispense Refill    phentermine (ADIPEX-P) 37.5 MG tablet Take 1 tablet by mouth every morning (before breakfast) for 30 days.  30 tablet 0    vitamin D (ERGOCALCIFEROL) 1.25 MG (52298 UT) CAPS capsule Take 1 capsule by mouth once a week 12 capsule 1 No current facility-administered medications for this visit. Allergies   Allergen Reactions    No Known Allergies        Health Maintenance   Topic Date Due    Varicella vaccine (1 of 2 - 2-dose childhood series) Never done    Hepatitis C screen  Never done    Cervical cancer screen  Never done    COVID-19 Vaccine (4 - Booster for Moderna series) 05/11/2022    Depression Screen  10/06/2023    DTaP/Tdap/Td vaccine (4 - Td or Tdap) 11/15/2028    Flu vaccine  Completed    HIV screen  Completed    Hepatitis A vaccine  Aged Out    Hib vaccine  Aged Out    Meningococcal (ACWY) vaccine  Aged Out    Pneumococcal 0-64 years Vaccine  Aged Out       :     Review of Systems   Constitutional:  Negative for chills, fatigue and fever. HENT:  Negative for ear discharge, ear pain, sinus pressure, sinus pain, sore throat and trouble swallowing. Eyes:  Negative for discharge, redness and itching. Respiratory:  Negative for cough, chest tightness, shortness of breath and wheezing. Cardiovascular:  Negative for chest pain. Gastrointestinal:  Negative for abdominal pain, diarrhea, nausea and vomiting. Genitourinary:  Negative for difficulty urinating. Musculoskeletal:  Negative for arthralgias and neck pain. Skin:  Negative for rash. Neurological:  Negative for dizziness, weakness, light-headedness and headaches. All other systems reviewed and are negative. Objective:     Physical Exam  Constitutional:       Appearance: Normal appearance. She is normal weight. HENT:      Head: Normocephalic and atraumatic. Nose: Nose normal.   Eyes:      Extraocular Movements: Extraocular movements intact. Conjunctiva/sclera: Conjunctivae normal.      Pupils: Pupils are equal, round, and reactive to light. Cardiovascular:      Rate and Rhythm: Normal rate and regular rhythm. Pulses: Normal pulses. Heart sounds: Normal heart sounds.    Pulmonary:      Effort: Pulmonary effort is normal.      Breath sounds: Normal breath sounds. Abdominal:      General: Abdomen is flat. Palpations: Abdomen is soft. Musculoskeletal:         General: Normal range of motion. Cervical back: Neck supple. Skin:     General: Skin is warm and dry. Capillary Refill: Capillary refill takes less than 2 seconds. Neurological:      General: No focal deficit present. Mental Status: She is alert and oriented to person, place, and time. Psychiatric:         Mood and Affect: Mood normal.     /68   Pulse 74   Resp 12   Wt 166 lb 12.8 oz (75.7 kg)   SpO2 100%   BMI 30.51 kg/m²     Assessment:       Diagnosis Orders   1. Other iron deficiency anemia        2. Obesity, Class II, BMI 35-39. 9  phentermine (ADIPEX-P) 37.5 MG tablet          :      Return in about 1 month (around 12/7/2022) for adipex. 1.  Iron deficiency anemia  Patient is started iron transfusions  She is well with pathology  2. Obesity  Rx refill for Adipex 37.5 mg p.o. daily. Will be her second month  Encouraged to continue with healthy lifestyle choices and staying active    Follow-up in 30 days  Orders Placed This Encounter   Medications    phentermine (ADIPEX-P) 37.5 MG tablet     Sig: Take 1 tablet by mouth every morning (before breakfast) for 30 days. Dispense:  30 tablet     Refill:  0       Patient given educational materials - seepatient instructions. Discussed use, benefit, and side effects of prescribed medications. All patient questions answered. Pt voiced understanding. Reviewed health maintenance. Instructed to continue current medications, diet and exercise. Patient agreedwith treatment plan. Follow up as directed.       Electronically signed by SALIMA Santamaria CNP on 11/8/2022at 3:42 PM

## 2022-11-08 ASSESSMENT — ENCOUNTER SYMPTOMS
VOMITING: 0
CHEST TIGHTNESS: 0
TROUBLE SWALLOWING: 0
COUGH: 0
SINUS PRESSURE: 0
WHEEZING: 0
SINUS PAIN: 0
ABDOMINAL PAIN: 0
SHORTNESS OF BREATH: 0
NAUSEA: 0
EYE REDNESS: 0
EYE DISCHARGE: 0
EYE ITCHING: 0
DIARRHEA: 0
SORE THROAT: 0

## 2022-11-11 ENCOUNTER — HOSPITAL ENCOUNTER (OUTPATIENT)
Dept: INFUSION THERAPY | Age: 32
Discharge: HOME | End: 2022-11-11
Payer: COMMERCIAL

## 2022-11-11 VITALS
TEMPERATURE: 98.8 F | SYSTOLIC BLOOD PRESSURE: 112 MMHG | RESPIRATION RATE: 16 BRPM | HEART RATE: 69 BPM | DIASTOLIC BLOOD PRESSURE: 72 MMHG

## 2022-11-11 DIAGNOSIS — D50.9 IRON DEFICIENCY ANEMIA, UNSPECIFIED IRON DEFICIENCY ANEMIA TYPE: Primary | ICD-10-CM

## 2022-11-11 PROCEDURE — 2580000003 HC RX 258: Performed by: NURSE PRACTITIONER

## 2022-11-11 PROCEDURE — 6360000002 HC RX W HCPCS: Performed by: NURSE PRACTITIONER

## 2022-11-11 PROCEDURE — 96365 THER/PROPH/DIAG IV INF INIT: CPT

## 2022-11-11 RX ORDER — SODIUM CHLORIDE 9 MG/ML
INJECTION, SOLUTION INTRAVENOUS CONTINUOUS
Status: DISCONTINUED | OUTPATIENT
Start: 2022-11-11 | End: 2022-11-12 | Stop reason: HOSPADM

## 2022-11-11 RX ORDER — ACETAMINOPHEN 325 MG/1
650 TABLET ORAL
OUTPATIENT
Start: 2022-11-17

## 2022-11-11 RX ORDER — ONDANSETRON 2 MG/ML
8 INJECTION INTRAMUSCULAR; INTRAVENOUS
OUTPATIENT
Start: 2022-11-17

## 2022-11-11 RX ORDER — DIPHENHYDRAMINE HYDROCHLORIDE 50 MG/ML
50 INJECTION INTRAMUSCULAR; INTRAVENOUS
OUTPATIENT
Start: 2022-11-17

## 2022-11-11 RX ORDER — HEPARIN SODIUM (PORCINE) LOCK FLUSH IV SOLN 100 UNIT/ML 100 UNIT/ML
500 SOLUTION INTRAVENOUS PRN
OUTPATIENT
Start: 2022-11-17

## 2022-11-11 RX ORDER — EPINEPHRINE 1 MG/ML
0.3 INJECTION, SOLUTION, CONCENTRATE INTRAVENOUS PRN
OUTPATIENT
Start: 2022-11-17

## 2022-11-11 RX ORDER — SODIUM CHLORIDE 9 MG/ML
INJECTION, SOLUTION INTRAVENOUS CONTINUOUS
OUTPATIENT
Start: 2022-11-17

## 2022-11-11 RX ORDER — ALBUTEROL SULFATE 90 UG/1
4 AEROSOL, METERED RESPIRATORY (INHALATION) PRN
OUTPATIENT
Start: 2022-11-17

## 2022-11-11 RX ORDER — SODIUM CHLORIDE 0.9 % (FLUSH) 0.9 %
5-40 SYRINGE (ML) INJECTION PRN
OUTPATIENT
Start: 2022-11-17

## 2022-11-11 RX ORDER — SODIUM CHLORIDE 9 MG/ML
5-250 INJECTION, SOLUTION INTRAVENOUS PRN
OUTPATIENT
Start: 2022-11-17

## 2022-11-11 RX ORDER — MEPERIDINE HYDROCHLORIDE 50 MG/ML
12.5 INJECTION INTRAMUSCULAR; INTRAVENOUS; SUBCUTANEOUS PRN
OUTPATIENT
Start: 2022-11-17

## 2022-11-11 RX ORDER — FAMOTIDINE 10 MG/ML
20 INJECTION, SOLUTION INTRAVENOUS
OUTPATIENT
Start: 2022-11-17

## 2022-11-11 RX ADMIN — SODIUM CHLORIDE: 9 INJECTION, SOLUTION INTRAVENOUS at 14:17

## 2022-11-11 RX ADMIN — IRON SUCROSE 200 MG: 20 INJECTION, SOLUTION INTRAVENOUS at 14:18

## 2022-11-11 NOTE — PROGRESS NOTES
Pt here for #3 of 5 Venofer. Tolerated without incident. Kept for 30 min observation with no s/s reaction. D/c'd in stable condition. Returns 11/17/22 for #4 infusion.

## 2022-11-18 ENCOUNTER — OFFICE VISIT (OUTPATIENT)
Dept: OBGYN CLINIC | Age: 32
End: 2022-11-18
Payer: COMMERCIAL

## 2022-11-18 ENCOUNTER — HOSPITAL ENCOUNTER (OUTPATIENT)
Age: 32
Setting detail: SPECIMEN
Discharge: HOME | End: 2022-11-18

## 2022-11-18 VITALS
HEIGHT: 61 IN | SYSTOLIC BLOOD PRESSURE: 112 MMHG | DIASTOLIC BLOOD PRESSURE: 78 MMHG | BODY MASS INDEX: 30.58 KG/M2 | WEIGHT: 162 LBS

## 2022-11-18 DIAGNOSIS — N89.8 VAGINAL DISCHARGE: ICD-10-CM

## 2022-11-18 DIAGNOSIS — Z11.3 SCREEN FOR STD (SEXUALLY TRANSMITTED DISEASE): ICD-10-CM

## 2022-11-18 DIAGNOSIS — Z30.09 COUNSELING FOR BIRTH CONTROL REGARDING INTRAUTERINE DEVICE (IUD): ICD-10-CM

## 2022-11-18 DIAGNOSIS — Z01.419 ENCOUNTER FOR GYNECOLOGICAL EXAMINATION: Primary | ICD-10-CM

## 2022-11-18 LAB
CANDIDA SPECIES, DNA PROBE: NEGATIVE
GARDNERELLA VAGINALIS, DNA PROBE: POSITIVE
SOURCE: ABNORMAL
TRICHOMONAS VAGINALIS DNA: NEGATIVE

## 2022-11-18 PROCEDURE — 99385 PREV VISIT NEW AGE 18-39: CPT

## 2022-11-18 RX ORDER — NAPROXEN 500 MG/1
500 TABLET ORAL 2 TIMES DAILY PRN
Qty: 60 TABLET | Refills: 1 | Status: SHIPPED | OUTPATIENT
Start: 2022-11-18

## 2022-11-18 NOTE — PROGRESS NOTES
[address] [facility] - [address] [address]  Dept: [phone]           Patient: Zoila Dela Cruz  Primary Care Physician: Carnell Duane, APRN - CNP   Chief Complaint   Patient presents with    New Patient    Annual Exam     Prev pap: ?    Menstrual Problem     1-3 days of period is heavy (lasting 5 days)       HPI: Zoila Dela Cruz is a 28 y.o. Megan Keeler who presents today to establish care and for her annual women's wellness exam. 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 Has a 11year old who was conceived with ivf. She is towards the end of her menstrual cycle today. She receives Iron infusions due to history of iron deficiency anemia. First noted with her first pregnancy. Sees hematology. Feels exhausted while on her menstrual cycle. Periods last 5 days, days #2-4 are heavy    OBSTETRICAL & GYNECOLOGICAL HISTORY:  OB History    Para Term  AB Living   1 1 1 0 0 1   SAB IAB Ectopic Molar Multiple Live Births   0 0 0 0 0 1      # Outcome Date GA Lbr Ed/2nd Weight Sex Delivery Anes PTL Lv   1 Term 17 41w1d  6 lb 3.3 oz (2.815 kg) M OTHER EPI N ASA      Name: Patrick Villeda)      Apgar1: 2  Apgar5: 9     Age of Menarche: 13-14  Patient's last menstrual period was 2022. Her periods are regular, and last 5 days. Bleeding is heavy  She denies dysmenorrhea. Sexually Active:-male partner. yes, not for the past 4 months  Dyspareunia: No  STD History: Yes  2015 had trichomonas fully treated  Birth Control: none    FAMILY HISTORY:  Family History of Breast, Ovarian, Colon or Uterine Cancer: No     family history includes Asthma in her brother; High Blood Pressure in her father, mother, paternal grandmother, and sister. SOCIAL HISTORY:    reports that she has never smoked.  She has never used smokeless tobacco. She reports that she does not drink alcohol and does not use drugs. The patient denied abuse or trauma history. MEDICAL HISTORY:  is allergic to no known allergies. Patient Active Problem List    Diagnosis Date Noted    Class 1 obesity due to excess calories without serious comorbidity with body mass index (BMI) of 32.0 to 32.9 in adult 2022     Priority: Medium    Iron deficiency anemia 2022     Priority: Medium    Obesity, Class II, BMI 35-39.9 2019     Priority: Medium     delivery delivered 2017     Priority: Medium     Formatting of this note might be different from the original.  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  Added automatically from request for surgery [id number]      Menorrhagia with regular cycle 2019    Vitamin D deficiency 2017      Prior to Admission medications    Medication Sig Start Date End Date Taking? Authorizing Provider   lidocaine (XYLOCAINE) 2 % jelly Insert vaginally 30 minutes before procedure 22  Yes SALIMA Hutchins CNP   naproxen (NAPROSYN) 500 MG tablet Take 1 tablet by mouth 2 times daily as needed for Pain (take twice per day consistently for first 4 days of menstrual cycle) 22  Yes SALIMA Hutchins CNP   phentermine (ADIPEX-P) 37.5 MG tablet Take 1 tablet by mouth every morning (before breakfast) for 30 days. 22 Yes SALIMA Truong CNP   vitamin D (ERGOCALCIFEROL) 1.25 MG (82754 UT) CAPS capsule Take 1 capsule by mouth once a week 10/6/22  Yes SALIMA Truong CNP      has a past medical history of Infertility, female, Iron deficiency anemia, Macromastia, and Menorrhagia.    has a past surgical history that includes  section (2017) and other surgical history (2008). HEALTH MAINTENANCE:  -Covid vaccine and booster recommended. Annual flu vaccine recommended October-April.   -Discussed Gardisil counseling for all patients 10-35 yo.  -Pap Smear collected today     2015 had an abnormal pap smear at [facility]. REVIEW OF SYSTEMS:   Review of Systems   Genitourinary:  Positive for vaginal bleeding and vaginal discharge. Negative for decreased urine volume, difficulty urinating, dyspareunia, flank pain, frequency, hematuria, menstrual problem, urgency and vaginal pain. All other systems reviewed and are negative. PHYSICAL EXAM:   Vitals:    22 1135   BP: 112/78   Position: Sitting   Cuff Size: Medium Adult   Weight: 162 lb (73.5 kg)   Height: 5' 1\" (1.549 m)      Physical Exam  Constitutional:       General: She is awake. She is not in acute distress. Appearance: Normal appearance. She is well-developed and well-groomed. She is not ill-appearing, toxic-appearing or diaphoretic. Genitourinary:      Urethral meatus normal.      Right Labia: No rash, tenderness, lesions, skin changes or Bartholin's cyst.     Left Labia: No tenderness, lesions, skin changes, Bartholin's cyst or rash. No vaginal discharge or tenderness. No cervical motion tenderness, discharge or friability. Breasts:     Breasts are symmetrical.      Breasts are soft. Right: Present. No swelling, bleeding, inverted nipple, mass, nipple discharge, skin change, tenderness or breast implant. Left: Present. No swelling, bleeding, inverted nipple, mass, nipple discharge, skin change, tenderness or breast implant. HENT:      Head: Normocephalic and atraumatic. Nose: Nose normal.   Eyes:      Extraocular Movements: Extraocular movements intact.    Pulmonary:      Effort: Pulmonary effort is normal.   Musculoskeletal:         General: Normal range of motion. Cervical back: Normal range of motion. Lymphadenopathy:      Upper Body:      Right upper body: No supraclavicular or axillary adenopathy. Left upper body: No supraclavicular or axillary adenopathy. Neurological:      General: No focal deficit present. Mental Status: She is alert and oriented to person, place, and time. Mental status is at baseline. Psychiatric:         Attention and Perception: Attention and perception normal.         Mood and Affect: Mood normal.         Speech: Speech normal.         Behavior: Behavior normal. Behavior is cooperative. Thought Content: Thought content normal.         Cognition and Memory: Cognition and memory normal.         Judgment: Judgment normal.   Vitals reviewed. ASSESSMENT & PLAN:    Cleveland Alberts is a 28 y.o. female  here for her annual women's wellness exam. Today, we discussed Izzy Rodriguez was seen today for new patient, annual exam and menstrual problem. Diagnoses and all orders for this visit:    Encounter for gynecological examination  -     PAP SMEAR; Future    Screen for STD (sexually transmitted disease)  -     C.trachomatis N.gonorrhoeae DNA; Future  -     Hepatitis C Antibody; Future  -     Hepatitis B Surface Antigen; Future  -     T. Pallidum Ab; Future  -     HIV Screen; Future  -     Herpes Profile; Future  -     Vaginitis DNA Probe; Future    Vaginal discharge  -     Vaginitis DNA Probe; Future    Counseling for birth control regarding intrauterine device (IUD)    Other orders  -     lidocaine (XYLOCAINE) 2 % jelly; Insert vaginally 30 minutes before procedure  -     naproxen (NAPROSYN) 500 MG tablet;  Take 1 tablet by mouth 2 times daily as needed for Pain (take twice per day consistently for first 4 days of menstrual cycle)     Return in about 4 weeks (around 2022) for iud insertion asap - meds sent to pharmacy already. Counseling Completed:  Discussed recommendations to repeat pap as per American Society for Colposcopy and Cervical Pathology guidelines. Discussed birth control and barrier recommendations. Discussed STD counseling and prevention. Hereditary Breast, Ovarian, Colon and Uterine Cancer screening discussed. Tobacco & Secondary smoke risks discussed; with recommendation for cessation and avoidance. Routine health maintenance per patients PCP discussed. Return to this office annually and PRN.     The patient was seen and evaluated face to face by SALIMA Elias CNP   11/21/2022, 12:04 PM

## 2022-11-21 LAB
C TRACH DNA GENITAL QL NAA+PROBE: NEGATIVE
N. GONORRHOEAE DNA: NEGATIVE
SPECIMEN DESCRIPTION: NORMAL

## 2022-11-21 RX ORDER — METRONIDAZOLE 500 MG/1
500 TABLET ORAL 2 TIMES DAILY
Qty: 14 TABLET | Refills: 0 | Status: SHIPPED | OUTPATIENT
Start: 2022-11-21 | End: 2022-11-28

## 2022-11-22 LAB
HPV SAMPLE: NORMAL
HPV, GENOTYPE 16: NOT DETECTED
HPV, GENOTYPE 18: NOT DETECTED
HPV, HIGH RISK OTHER: NOT DETECTED
HPV, INTERPRETATION: NORMAL
SPECIMEN DESCRIPTION: NORMAL

## 2022-12-08 ENCOUNTER — OFFICE VISIT (OUTPATIENT)
Dept: PRIMARY CARE CLINIC | Age: 32
End: 2022-12-08
Payer: COMMERCIAL

## 2022-12-08 ENCOUNTER — HOSPITAL ENCOUNTER (OUTPATIENT)
Age: 32
Setting detail: SPECIMEN
Discharge: HOME | End: 2022-12-08

## 2022-12-08 VITALS — BODY MASS INDEX: 30.61 KG/M2 | SYSTOLIC BLOOD PRESSURE: 108 MMHG | DIASTOLIC BLOOD PRESSURE: 78 MMHG | WEIGHT: 162 LBS

## 2022-12-08 DIAGNOSIS — E66.9 OBESITY, CLASS II, BMI 35-39.9: ICD-10-CM

## 2022-12-08 DIAGNOSIS — Z11.3 SCREEN FOR STD (SEXUALLY TRANSMITTED DISEASE): ICD-10-CM

## 2022-12-08 DIAGNOSIS — D50.8 OTHER IRON DEFICIENCY ANEMIA: Primary | ICD-10-CM

## 2022-12-08 LAB
HEPATITIS B SURFACE ANTIGEN: NONREACTIVE
HEPATITIS C ANTIBODY: NONREACTIVE
HIV AG/AB: NONREACTIVE
T. PALLIDUM, IGG: NONREACTIVE

## 2022-12-08 PROCEDURE — 99213 OFFICE O/P EST LOW 20 MIN: CPT | Performed by: NURSE PRACTITIONER

## 2022-12-08 ASSESSMENT — ENCOUNTER SYMPTOMS
CHEST TIGHTNESS: 0
EYE REDNESS: 0
COUGH: 0
NAUSEA: 0
WHEEZING: 0
SORE THROAT: 0
ABDOMINAL PAIN: 0
SINUS PRESSURE: 0
EYE DISCHARGE: 0
DIARRHEA: 0
SINUS PAIN: 0
TROUBLE SWALLOWING: 0
SHORTNESS OF BREATH: 0
EYE ITCHING: 0
VOMITING: 0

## 2022-12-08 NOTE — PROGRESS NOTES
[address] PRIMARY CARE  [facility] [address] [facility] [address]  [address] [address]  Dept: [phone]  Dept Fax: [phone]    Estefani Hendricks is a 28 y.o. female who presents today for her medical conditions/complaintsas noted below. Estefani Hendricks is c/o of No chief complaint on file. HPI:     Pt presents for a one month follow up  Bp stable  Weight is down 4 lbs since last appointment. Patient presents for 1 month follow-up for Adipex. This is her second month. She did start getting iron transfusions. This the first time that she has ever gotten iron transfusions. She is only ever been on oral supplements in the past.  She typically always craves ice. She can tell when her iron is better because she does not typically craves ice. Since getting the iron transfusions she no longer even really wants to drink water. She is able to drink water but she does not have much of a taste for it like she did before. She has been drinking a lot of juice and soda. Otherwise she has been trying to eat healthy and stay active. She does feel better    Getting IUD placed on  to try and lessen cycle.   Her heavy menstrual cycles are likely a reason for part of her iron deficiency anemia      Past Medical History:   Diagnosis Date    Infertility, female 1    IVF to have only pregnancy    Iron deficiency anemia     Macromastia     Menorrhagia       Past Surgical History:   Procedure Laterality Date     SECTION      OTHER SURGICAL HISTORY      Keloid scar removal from ears       Family History   Problem Relation Age of Onset    High Blood Pressure Mother     High Blood Pressure Father     High Blood Pressure Sister     Asthma Brother     High Blood Pressure Paternal Grandmother        Social History     Tobacco Use    Smoking status: Never    Smokeless tobacco: Never   Substance Use Topics    Alcohol use: No      Current Outpatient Medications   Medication Sig Dispense Refill    lidocaine (XYLOCAINE) 2 % jelly Insert vaginally 30 minutes before procedure 1 each 0    naproxen (NAPROSYN) 500 MG tablet Take 1 tablet by mouth 2 times daily as needed for Pain (take twice per day consistently for first 4 days of menstrual cycle) 60 tablet 1    vitamin D (ERGOCALCIFEROL) 1.25 MG (87323 UT) CAPS capsule Take 1 capsule by mouth once a week 12 capsule 1     No current facility-administered medications for this visit. Allergies   Allergen Reactions    No Known Allergies        Health Maintenance   Topic Date Due    Varicella vaccine (1 of 2 - 2-dose childhood series) Never done    Hepatitis C screen  Never done    COVID-19 Vaccine (4 - Booster for Moderna series) 05/11/2022    Depression Screen  10/06/2023    Cervical cancer screen  11/18/2027    DTaP/Tdap/Td vaccine (4 - Td or Tdap) 11/15/2028    Flu vaccine  Completed    HIV screen  Completed    Hepatitis A vaccine  Aged Out    Hib vaccine  Aged Out    Meningococcal (ACWY) vaccine  Aged Out    Pneumococcal 0-64 years Vaccine  Aged Out       :     Review of Systems   Constitutional:  Negative for chills, fatigue and fever. HENT:  Negative for ear discharge, ear pain, sinus pressure, sinus pain, sore throat and trouble swallowing. Eyes:  Negative for discharge, redness and itching. Respiratory:  Negative for cough, chest tightness, shortness of breath and wheezing. Cardiovascular:  Negative for chest pain. Gastrointestinal:  Negative for abdominal pain, diarrhea, nausea and vomiting. Genitourinary:  Negative for difficulty urinating. Musculoskeletal:  Negative for arthralgias and neck pain. Skin:  Negative for rash. Neurological:  Negative for dizziness, weakness, light-headedness and headaches. All other systems reviewed and are negative. Objective:     Physical Exam  Constitutional:       Appearance: Normal appearance. She is normal weight. HENT:      Head: Normocephalic and atraumatic.       Nose: Nose normal.   Eyes:      Extraocular Movements: Extraocular movements intact. Conjunctiva/sclera: Conjunctivae normal.      Pupils: Pupils are equal, round, and reactive to light. Cardiovascular:      Rate and Rhythm: Normal rate and regular rhythm. Pulses: Normal pulses. Heart sounds: Normal heart sounds. Pulmonary:      Effort: Pulmonary effort is normal.      Breath sounds: Normal breath sounds. Abdominal:      General: Abdomen is flat. Palpations: Abdomen is soft. Musculoskeletal:         General: Normal range of motion. Cervical back: Neck supple. Skin:     General: Skin is warm and dry. Capillary Refill: Capillary refill takes less than 2 seconds. Neurological:      General: No focal deficit present. Mental Status: She is alert and oriented to person, place, and time. Psychiatric:         Mood and Affect: Mood normal.     /78   Wt 162 lb (73.5 kg)   LMP 11/13/2022   BMI 30.61 kg/m²     Assessment:       Diagnosis Orders   1. Other iron deficiency anemia        2. Obesity, Class II, BMI 35-39.9            :      Return in about 3 months (around 3/8/2023) for general follow up. 1.IMAN  Getting IUD placed on 12/23  Following with hem/onc  Getting iron transfusions. Water issue maybe r/t correction of IMAN  2. Obesity  Rx refill for Adipex 37.5 mg p.o. daily. This will be her third month. Congratulated pt on weight loss so far. F/u in 3 months or sooner as needed she is agreeable. Patient given educational materials - seepatient instructions. Discussed use, benefit, and side effects of prescribed medications. All patient questions answered. Pt voiced understanding. Reviewed health maintenance. Instructed to continue current medications, diet and exercise. Patient agreedwith treatment plan. Follow up as directed.       Electronically signed by SALIMA Kerr CNP on 12/8/2022at 3:04 PM

## 2022-12-13 ENCOUNTER — TELEPHONE (OUTPATIENT)
Dept: PRIMARY CARE CLINIC | Age: 32
End: 2022-12-13

## 2022-12-13 DIAGNOSIS — R76.8 HSV-2 SEROPOSITIVE: Primary | ICD-10-CM

## 2022-12-13 DIAGNOSIS — E66.9 OBESITY, CLASS II, BMI 35-39.9: ICD-10-CM

## 2022-12-13 RX ORDER — VALACYCLOVIR HYDROCHLORIDE 1 G/1
1000 TABLET, FILM COATED ORAL 2 TIMES DAILY
Qty: 14 TABLET | Refills: 0 | Status: SHIPPED | OUTPATIENT
Start: 2022-12-13 | End: 2022-12-20

## 2022-12-13 RX ORDER — PHENTERMINE HYDROCHLORIDE 37.5 MG/1
37.5 TABLET ORAL
Qty: 30 TABLET | Refills: 0 | Status: SHIPPED | OUTPATIENT
Start: 2022-12-13 | End: 2023-01-12

## 2022-12-13 NOTE — TELEPHONE ENCOUNTER
Patient calling into office, states that she was supposed to have adipex called in on 12/08/22 and it was not sent, she is concerned because this is a date specific medication    Last Visit Date: 12/8/2022   Next Visit Date: 3/9/2023

## 2023-01-12 ENCOUNTER — PROCEDURE VISIT (OUTPATIENT)
Dept: OBGYN CLINIC | Age: 33
End: 2023-01-12
Payer: COMMERCIAL

## 2023-01-12 ENCOUNTER — HOSPITAL ENCOUNTER (OUTPATIENT)
Age: 33
Setting detail: SPECIMEN
Discharge: HOME | End: 2023-01-12

## 2023-01-12 VITALS
BODY MASS INDEX: 30.21 KG/M2 | SYSTOLIC BLOOD PRESSURE: 122 MMHG | DIASTOLIC BLOOD PRESSURE: 62 MMHG | HEIGHT: 61 IN | WEIGHT: 160 LBS

## 2023-01-12 DIAGNOSIS — Z30.430 ENCOUNTER FOR IUD INSERTION: ICD-10-CM

## 2023-01-12 DIAGNOSIS — Z30.430 ENCOUNTER FOR IUD INSERTION: Primary | ICD-10-CM

## 2023-01-12 DIAGNOSIS — N92.0 MENORRHAGIA WITH REGULAR CYCLE: ICD-10-CM

## 2023-01-12 PROCEDURE — 58300 INSERT INTRAUTERINE DEVICE: CPT

## 2023-01-13 NOTE — PROGRESS NOTES
[address] [facility]  [facility] - [address]  [address]  Dept: [phone]    OB/GYN   Procedure Note    Karina Lujan  1/12/2023                       Primary Care Physician: SALIMA Santacruz CNP      Subjective:   Karina Lujan 28 y.o. female Raymond Chavis is here for previously scheduled Mirena IUD due to history of dysmenorrhea. Patient's last menstrual period was 01/09/2023. Tashia Mena She has no complaints today. She is known to have heavy, painful menses that interfere with her ADL's. She has tried NSAIDS in the past with little success. She wishes to continue to utilize barrier contraception for family planning and STD precautions. The side effect profile of the IUD was reviewed. All other forms of family planning and options for treatment of her dysmenorrhea were reviewed with the patient. Vitals:   Blood pressure 122/62, height 5' 1\" (1.549 m), weight 160 lb (72.6 kg), last menstrual period 01/09/2023. Lab:  Pregnancy Test:  No results found for: PREGTESTUR, PREGSERUM, HCG, HCGQUANT    GC Cultures: collected    Procedure: Insertion of Mirena IUD    Indications: Dysmenorrhea     Mirena IUD Insertion   The patient was counseled on the procedure. Risks, benefits and alternatives were reviewed. The patient is aware that this is diagnostic and not curative and a second procedure may be needed. A consent was reviewed and obtained. A sterile speculum was placed without incident. The cervix was then cleansed with betadine The Mirena IUD was opened and loaded into the delivery system. An Allis clamp was placed for stabilization. The wand was inserted just past the internal portio and the button was retracted to the first line. The wand was held in place for 10 seconds and then the button was retracted to its final position while the IUD was moved to the fundus, measuring 7 cm.  The string was trimmed in standard fashion and the [employer] clamp was removed.  The speculum was then removed. The patient tolerated the procedure without difficulty.     Assessment & Plan:  Norman Dumont 32 y.o. female   Patient Active Problem List    Diagnosis Date Noted    HSV-2 seropositive 2022     Priority: Medium    Class 1 obesity due to excess calories without serious comorbidity with body mass index (BMI) of 32.0 to 32.9 in adult 2022     Priority: Medium    Iron deficiency anemia 2022     Priority: Medium    Obesity, Class II, BMI 35-39.9 2019     Priority: Medium     delivery delivered 2017     Priority: Medium     Formatting of this note might be different from the original.  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  Added automatically from request for surgery [id number]      Menorrhagia with regular cycle 2019    Vitamin D deficiency 2017     Family Planning Counseling Completed  Barrier Recommendations discussed  Removal of the Mirena IUD will be in 8 years on 2031     Return for string check.     The patient was counseled on follow up and home care with restrictions noted.     Post procedure restrictions were reviewed and given to the patient. She was instructed to use barrier protection for sexually transmitted disease prevention as well as string checks/timing. She was instructed to abstain for two weeks. She is to notify the office or go to the nearest Emergency Department if she experiences Abdominal Pain, Temperatures more than 101 F, Odiferous Vaginal Discharge, Dizziness or Shortness of breath.    The patient was counseled on the need for string checks after her menses and coital  activity. She is to notify the office if she can not locate the IUD string at anytime. She is aware that she will need a speculum exam and possibly an ultrasound to confirm the proper orientation of the IUD.       Andres Pollen, APRN - CNP  [facility]   1/12/2023, 9:34 PM

## 2023-01-30 DIAGNOSIS — Z30.431 CHECKING OF INTRAUTERINE DEVICE: Primary | ICD-10-CM

## 2023-02-23 DIAGNOSIS — D50.9 IRON DEFICIENCY ANEMIA, UNSPECIFIED IRON DEFICIENCY ANEMIA TYPE: Primary | ICD-10-CM

## 2023-04-06 ENCOUNTER — HOSPITAL ENCOUNTER (OUTPATIENT)
Age: 33
Setting detail: SPECIMEN
Discharge: HOME | End: 2023-04-06

## 2023-04-06 ENCOUNTER — OFFICE VISIT (OUTPATIENT)
Dept: PRIMARY CARE CLINIC | Age: 33
End: 2023-04-06
Payer: COMMERCIAL

## 2023-04-06 VITALS
BODY MASS INDEX: 31.21 KG/M2 | SYSTOLIC BLOOD PRESSURE: 116 MMHG | RESPIRATION RATE: 14 BRPM | WEIGHT: 165.2 LBS | DIASTOLIC BLOOD PRESSURE: 72 MMHG | OXYGEN SATURATION: 100 % | HEART RATE: 65 BPM

## 2023-04-06 DIAGNOSIS — D50.9 IRON DEFICIENCY ANEMIA, UNSPECIFIED IRON DEFICIENCY ANEMIA TYPE: ICD-10-CM

## 2023-04-06 DIAGNOSIS — N92.0 MENORRHAGIA WITH REGULAR CYCLE: ICD-10-CM

## 2023-04-06 DIAGNOSIS — E66.9 OBESITY, CLASS II, BMI 35-39.9: ICD-10-CM

## 2023-04-06 DIAGNOSIS — D50.8 OTHER IRON DEFICIENCY ANEMIA: Primary | ICD-10-CM

## 2023-04-06 LAB
ABSOLUTE EOS #: 0.35 K/UL (ref 0–0.44)
ABSOLUTE IMMATURE GRANULOCYTE: <0.03 K/UL (ref 0–0.3)
ABSOLUTE LYMPH #: 1.28 K/UL (ref 1.1–3.7)
ABSOLUTE MONO #: 0.29 K/UL (ref 0.1–1.2)
BASOPHILS # BLD: 2 % (ref 0–2)
BASOPHILS ABSOLUTE: 0.08 K/UL (ref 0–0.2)
EOSINOPHILS RELATIVE PERCENT: 8 % (ref 1–4)
FERRITIN SERPL-MCNC: 5 NG/ML (ref 13–150)
FOLATE SERPL-MCNC: 7.7 NG/ML
HCT VFR BLD AUTO: 33.4 % (ref 36.3–47.1)
HGB BLD-MCNC: 10 G/DL (ref 11.9–15.1)
IMMATURE GRANULOCYTES: 0 %
IRON SATURATION: 9 % (ref 20–55)
IRON SERPL-MCNC: 33 UG/DL (ref 37–145)
LYMPHOCYTES # BLD: 30 % (ref 24–43)
MCH RBC QN AUTO: 23.9 PG (ref 25.2–33.5)
MCHC RBC AUTO-ENTMCNC: 29.9 G/DL (ref 28.4–34.8)
MCV RBC AUTO: 79.9 FL (ref 82.6–102.9)
MONOCYTES # BLD: 7 % (ref 3–12)
NRBC AUTOMATED: 0 PER 100 WBC
PDW BLD-RTO: 15.9 % (ref 11.8–14.4)
PLATELET # BLD AUTO: 299 K/UL (ref 138–453)
PMV BLD AUTO: 10.9 FL (ref 8.1–13.5)
RBC # BLD: 4.18 M/UL (ref 3.95–5.11)
RBC # BLD: ABNORMAL 10*6/UL
SEG NEUTROPHILS: 53 % (ref 36–65)
SEGMENTED NEUTROPHILS ABSOLUTE COUNT: 2.26 K/UL (ref 1.5–8.1)
TIBC SERPL-MCNC: 381 UG/DL (ref 250–450)
UNSATURATED IRON BINDING CAPACITY: 348 UG/DL (ref 112–347)
VIT B12 SERPL-MCNC: 574 PG/ML (ref 232–1245)
WBC # BLD AUTO: 4.3 K/UL (ref 3.5–11.3)

## 2023-04-06 PROCEDURE — 1036F TOBACCO NON-USER: CPT | Performed by: NURSE PRACTITIONER

## 2023-04-06 PROCEDURE — 99214 OFFICE O/P EST MOD 30 MIN: CPT | Performed by: NURSE PRACTITIONER

## 2023-04-06 PROCEDURE — G8427 DOCREV CUR MEDS BY ELIG CLIN: HCPCS | Performed by: NURSE PRACTITIONER

## 2023-04-06 PROCEDURE — G8417 CALC BMI ABV UP PARAM F/U: HCPCS | Performed by: NURSE PRACTITIONER

## 2023-04-06 RX ORDER — PHENTERMINE HYDROCHLORIDE 37.5 MG/1
37.5 TABLET ORAL
Qty: 30 TABLET | Refills: 0 | Status: SHIPPED | OUTPATIENT
Start: 2023-04-06 | End: 2023-05-06

## 2023-04-06 SDOH — ECONOMIC STABILITY: FOOD INSECURITY: WITHIN THE PAST 12 MONTHS, THE FOOD YOU BOUGHT JUST DIDN'T LAST AND YOU DIDN'T HAVE MONEY TO GET MORE.: NEVER TRUE

## 2023-04-06 SDOH — ECONOMIC STABILITY: FOOD INSECURITY: WITHIN THE PAST 12 MONTHS, YOU WORRIED THAT YOUR FOOD WOULD RUN OUT BEFORE YOU GOT MONEY TO BUY MORE.: NEVER TRUE

## 2023-04-06 SDOH — ECONOMIC STABILITY: INCOME INSECURITY: HOW HARD IS IT FOR YOU TO PAY FOR THE VERY BASICS LIKE FOOD, HOUSING, MEDICAL CARE, AND HEATING?: NOT HARD AT ALL

## 2023-04-06 SDOH — ECONOMIC STABILITY: HOUSING INSECURITY
IN THE LAST 12 MONTHS, WAS THERE A TIME WHEN YOU DID NOT HAVE A STEADY PLACE TO SLEEP OR SLEPT IN A SHELTER (INCLUDING NOW)?: NO

## 2023-04-06 ASSESSMENT — PATIENT HEALTH QUESTIONNAIRE - PHQ9
1. LITTLE INTEREST OR PLEASURE IN DOING THINGS: 0
SUM OF ALL RESPONSES TO PHQ9 QUESTIONS 1 & 2: 0
2. FEELING DOWN, DEPRESSED OR HOPELESS: 0
SUM OF ALL RESPONSES TO PHQ QUESTIONS 1-9: 0

## 2023-04-06 ASSESSMENT — ENCOUNTER SYMPTOMS
TROUBLE SWALLOWING: 0
DIARRHEA: 0
SINUS PRESSURE: 0
SINUS PAIN: 0
EYE ITCHING: 0
EYE REDNESS: 0
VOMITING: 0
ABDOMINAL PAIN: 0
SHORTNESS OF BREATH: 0
SORE THROAT: 0
CHEST TIGHTNESS: 0
NAUSEA: 0
COUGH: 0
EYE DISCHARGE: 0
WHEEZING: 0

## 2023-04-06 NOTE — PROGRESS NOTES
Facility-Administered Medications   Medication Dose Route Frequency Provider Last Rate Last Admin    levonorgestrel (MIRENA) IUD 52 mg 1 each  1 each IntraUTERine Once Levora Ray, APRN - CNP   1 each at 01/12/23 8149     Allergies   Allergen Reactions    No Known Allergies        Health Maintenance   Topic Date Due    Varicella vaccine (1 of 2 - 2-dose childhood series) Never done    COVID-19 Vaccine (4 - Booster for Moderna series) 05/11/2022    Depression Screen  10/06/2023    Cervical cancer screen  11/18/2027    DTaP/Tdap/Td vaccine (4 - Td or Tdap) 11/15/2028    Flu vaccine  Completed    Hepatitis C screen  Completed    HIV screen  Completed    Hepatitis A vaccine  Aged Out    Hib vaccine  Aged Out    Meningococcal (ACWY) vaccine  Aged Out    Pneumococcal 0-64 years Vaccine  Aged Out    Hepatitis B vaccine  Discontinued       :     Review of Systems   Constitutional:  Negative for chills, fatigue and fever. HENT:  Negative for ear discharge, ear pain, sinus pressure, sinus pain, sore throat and trouble swallowing. Eyes:  Negative for discharge, redness and itching. Respiratory:  Negative for cough, chest tightness, shortness of breath and wheezing. Cardiovascular:  Negative for chest pain. Gastrointestinal:  Negative for abdominal pain, diarrhea, nausea and vomiting. Genitourinary:  Negative for difficulty urinating. Musculoskeletal:  Negative for arthralgias and neck pain. Skin:  Negative for rash. Neurological:  Negative for dizziness, weakness, light-headedness and headaches. All other systems reviewed and are negative. Objective:     Physical Exam  Constitutional:       Appearance: Normal appearance. She is normal weight. HENT:      Head: Normocephalic and atraumatic. Nose: Nose normal.   Eyes:      Extraocular Movements: Extraocular movements intact. Conjunctiva/sclera: Conjunctivae normal.      Pupils: Pupils are equal, round, and reactive to light.

## 2023-05-04 ENCOUNTER — OFFICE VISIT (OUTPATIENT)
Dept: PRIMARY CARE CLINIC | Age: 33
End: 2023-05-04
Payer: COMMERCIAL

## 2023-05-04 VITALS
OXYGEN SATURATION: 100 % | DIASTOLIC BLOOD PRESSURE: 74 MMHG | HEART RATE: 60 BPM | WEIGHT: 157.8 LBS | SYSTOLIC BLOOD PRESSURE: 114 MMHG | RESPIRATION RATE: 14 BRPM | BODY MASS INDEX: 29.82 KG/M2

## 2023-05-04 DIAGNOSIS — E66.3 OVERWEIGHT (BMI 25.0-29.9): ICD-10-CM

## 2023-05-04 DIAGNOSIS — Z71.3 DIETARY COUNSELING: Primary | ICD-10-CM

## 2023-05-04 PROCEDURE — G8417 CALC BMI ABV UP PARAM F/U: HCPCS | Performed by: NURSE PRACTITIONER

## 2023-05-04 PROCEDURE — 99213 OFFICE O/P EST LOW 20 MIN: CPT | Performed by: NURSE PRACTITIONER

## 2023-05-04 PROCEDURE — G8427 DOCREV CUR MEDS BY ELIG CLIN: HCPCS | Performed by: NURSE PRACTITIONER

## 2023-05-04 PROCEDURE — 1036F TOBACCO NON-USER: CPT | Performed by: NURSE PRACTITIONER

## 2023-05-04 RX ORDER — PHENTERMINE HYDROCHLORIDE 37.5 MG/1
37.5 TABLET ORAL
Qty: 30 TABLET | Refills: 0 | Status: SHIPPED | OUTPATIENT
Start: 2023-05-04 | End: 2023-06-03

## 2023-05-04 ASSESSMENT — ENCOUNTER SYMPTOMS
SORE THROAT: 0
NAUSEA: 0
TROUBLE SWALLOWING: 0
VOMITING: 0
SINUS PRESSURE: 0
SINUS PAIN: 0
COUGH: 0
EYE DISCHARGE: 0
CHEST TIGHTNESS: 0
WHEEZING: 0
DIARRHEA: 0
SHORTNESS OF BREATH: 0
ABDOMINAL PAIN: 0
EYE REDNESS: 0
EYE ITCHING: 0

## 2023-05-04 ASSESSMENT — PATIENT HEALTH QUESTIONNAIRE - PHQ9
SUM OF ALL RESPONSES TO PHQ QUESTIONS 1-9: 0
2. FEELING DOWN, DEPRESSED OR HOPELESS: 0
1. LITTLE INTEREST OR PLEASURE IN DOING THINGS: 0
SUM OF ALL RESPONSES TO PHQ QUESTIONS 1-9: 0
SUM OF ALL RESPONSES TO PHQ9 QUESTIONS 1 & 2: 0

## 2023-05-04 NOTE — PROGRESS NOTES
[address] [facility]"" PRIMARY CARE  [facility] [address] [facility] [address]  [address] [address]  Dept: [phone]  Dept Fax: [phone]    Nicole Zuleta is a 28 y.o. female who presents today for her medical conditions/complaintsas noted below. Nicole Zuleta is c/o of Weight Management (Adipex f/u)        HPI:     Patient presents for 1 month follow-up  Blood pressure stable  Weight is down 8 pounds    Patient presents for 1 month Adipex follow-up. She is been able to lose 8 pounds. She is watching her diet. She is staying active. She denies any side effects. She has taken Adipex before. Past Medical History:   Diagnosis Date    Infertility, female 1    IVF to have only pregnancy    Iron deficiency anemia     Macromastia     Menorrhagia       Past Surgical History:   Procedure Laterality Date     SECTION  2017    OTHER SURGICAL HISTORY      Keloid scar removal from ears       Family History   Problem Relation Age of Onset    High Blood Pressure Mother     High Blood Pressure Father     High Blood Pressure Sister     Asthma Brother     High Blood Pressure Paternal Grandmother        Social History     Tobacco Use    Smoking status: Never    Smokeless tobacco: Never   Substance Use Topics    Alcohol use: No      Current Outpatient Medications   Medication Sig Dispense Refill    phentermine (ADIPEX-P) 37.5 MG tablet Take 1 tablet by mouth every morning (before breakfast) for 30 days.  Max Daily Amount: 37.5 mg 30 tablet 0    vitamin D (ERGOCALCIFEROL) 1.25 MG (80969 UT) CAPS capsule Take 1 capsule by mouth once a week 12 capsule 1     Current Facility-Administered Medications   Medication Dose Route Frequency Provider Last Rate Last Admin    levonorgestrel (MIRENA) IUD 52 mg 1 each  1 each IntraUTERine Once Chuck Perales, APRN - CNP   1 each at 23 1619     Allergies   Allergen Reactions    No Known Allergies        Health Maintenance   Topic Date Due    Varicella

## 2023-06-02 ENCOUNTER — TELEPHONE (OUTPATIENT)
Dept: PRIMARY CARE CLINIC | Age: 33
End: 2023-06-02

## 2023-06-02 ENCOUNTER — OFFICE VISIT (OUTPATIENT)
Dept: PRIMARY CARE CLINIC | Age: 33
End: 2023-06-02
Payer: COMMERCIAL

## 2023-06-02 VITALS
SYSTOLIC BLOOD PRESSURE: 110 MMHG | OXYGEN SATURATION: 99 % | DIASTOLIC BLOOD PRESSURE: 68 MMHG | WEIGHT: 150.6 LBS | BODY MASS INDEX: 28.46 KG/M2 | HEART RATE: 80 BPM | RESPIRATION RATE: 15 BRPM

## 2023-06-02 DIAGNOSIS — E66.3 OVERWEIGHT (BMI 25.0-29.9): Primary | ICD-10-CM

## 2023-06-02 DIAGNOSIS — R22.42 MASS OF LEFT THIGH: ICD-10-CM

## 2023-06-02 PROCEDURE — 99214 OFFICE O/P EST MOD 30 MIN: CPT | Performed by: NURSE PRACTITIONER

## 2023-06-02 PROCEDURE — G8427 DOCREV CUR MEDS BY ELIG CLIN: HCPCS | Performed by: NURSE PRACTITIONER

## 2023-06-02 PROCEDURE — G8417 CALC BMI ABV UP PARAM F/U: HCPCS | Performed by: NURSE PRACTITIONER

## 2023-06-02 PROCEDURE — 1036F TOBACCO NON-USER: CPT | Performed by: NURSE PRACTITIONER

## 2023-06-02 RX ORDER — PHENTERMINE HYDROCHLORIDE 37.5 MG/1
37.5 TABLET ORAL
Qty: 30 TABLET | Refills: 0 | Status: SHIPPED | OUTPATIENT
Start: 2023-06-02 | End: 2023-07-02

## 2023-06-02 RX ORDER — METRONIDAZOLE 500 MG/1
TABLET ORAL
Qty: 14 TABLET | Refills: 0 | OUTPATIENT
Start: 2023-06-02

## 2023-06-02 ASSESSMENT — ENCOUNTER SYMPTOMS
RHINORRHEA: 0
SORE THROAT: 0
NAUSEA: 0
DIARRHEA: 0
COLOR CHANGE: 0
CHEST TIGHTNESS: 0
ABDOMINAL PAIN: 0
VOMITING: 0
SHORTNESS OF BREATH: 0

## 2023-06-02 ASSESSMENT — PATIENT HEALTH QUESTIONNAIRE - PHQ9
SUM OF ALL RESPONSES TO PHQ9 QUESTIONS 1 & 2: 0
SUM OF ALL RESPONSES TO PHQ QUESTIONS 1-9: 0
1. LITTLE INTEREST OR PLEASURE IN DOING THINGS: 0
SUM OF ALL RESPONSES TO PHQ QUESTIONS 1-9: 0
2. FEELING DOWN, DEPRESSED OR HOPELESS: 0

## 2023-06-02 NOTE — PROGRESS NOTES
[address] PRIMARY CARE  [facility] [address] [facility] [address]  [address] [address]  Dept: [phone]  Dept Fax: [phone]    Agustín Park is a 28 y.o. female who presentstoday for her medical conditions/complaints as noted below. Agustín Park is c/o of  Chief Complaint   Patient presents with    Follow-up     Med Check; Concern for lump in left leg x 2-3  weeks         HPI:     Here for 1 month follow-up for Adipex  Is established patient of Cougar, NP  Is down 7 pounds from last office visit for total of 15 pounds and starting Adipex. Congratulated!   Denies adverse side effects of medications     Also c/o lump in left upper leg that is new in last few weeks  She denies pain, swelling, redness or warmth   No known injury   Thinks may be getting smaller        Hemoglobin A1C (%)   Date Value   2016 4.5             ( goal A1C is < 7)   No results found for: LABMICR  LDL Calculated (mg/dL)   Date Value   2018 85       (goal LDL is <100)   AST (U/L)   Date Value   2022 15     ALT (U/L)   Date Value   2022 16     BUN (mg/dL)   Date Value   2022 10     BP Readings from Last 3 Encounters:   23 110/68   23 114/74   23 116/72          ([id number])    Past Medical History:   Diagnosis Date    Infertility, female 1    IVF to have only pregnancy    Iron deficiency anemia     Macromastia     Menorrhagia       Past Surgical History:   Procedure Laterality Date     SECTION  2017    OTHER SURGICAL HISTORY  2008    Keloid scar removal from ears       Family History   Problem Relation Age of Onset    High Blood Pressure Mother     High Blood Pressure Father     High Blood Pressure Sister     Asthma Brother     High Blood Pressure Paternal Grandmother        Social History     Tobacco Use    Smoking status: Never    Smokeless tobacco: Never   Substance Use Topics    Alcohol use: No      Current Outpatient Medications   Medication Sig

## 2023-07-11 ENCOUNTER — OFFICE VISIT (OUTPATIENT)
Dept: PRIMARY CARE CLINIC | Age: 33
End: 2023-07-11
Payer: COMMERCIAL

## 2023-07-11 ENCOUNTER — HOSPITAL ENCOUNTER (OUTPATIENT)
Age: 33
Setting detail: SPECIMEN
Discharge: HOME | End: 2023-07-11

## 2023-07-11 ENCOUNTER — OFFICE VISIT (OUTPATIENT)
Dept: OBGYN CLINIC | Age: 33
End: 2023-07-11
Payer: COMMERCIAL

## 2023-07-11 VITALS
DIASTOLIC BLOOD PRESSURE: 70 MMHG | BODY MASS INDEX: 28.89 KG/M2 | SYSTOLIC BLOOD PRESSURE: 110 MMHG | WEIGHT: 153 LBS | HEIGHT: 61 IN

## 2023-07-11 VITALS
WEIGHT: 153.4 LBS | RESPIRATION RATE: 14 BRPM | HEART RATE: 64 BPM | BODY MASS INDEX: 28.98 KG/M2 | DIASTOLIC BLOOD PRESSURE: 70 MMHG | OXYGEN SATURATION: 99 % | SYSTOLIC BLOOD PRESSURE: 118 MMHG

## 2023-07-11 DIAGNOSIS — Z71.3 DIETARY COUNSELING: Primary | ICD-10-CM

## 2023-07-11 DIAGNOSIS — Z97.5 IUD (INTRAUTERINE DEVICE) IN PLACE: ICD-10-CM

## 2023-07-11 DIAGNOSIS — N76.0 ACUTE VAGINITIS: ICD-10-CM

## 2023-07-11 DIAGNOSIS — N89.8 VAGINAL ODOR: ICD-10-CM

## 2023-07-11 DIAGNOSIS — N89.8 VAGINAL ODOR: Primary | ICD-10-CM

## 2023-07-11 DIAGNOSIS — Z30.431 IUD CHECK UP: ICD-10-CM

## 2023-07-11 DIAGNOSIS — E66.3 OVERWEIGHT (BMI 25.0-29.9): ICD-10-CM

## 2023-07-11 PROCEDURE — G8427 DOCREV CUR MEDS BY ELIG CLIN: HCPCS

## 2023-07-11 PROCEDURE — G8427 DOCREV CUR MEDS BY ELIG CLIN: HCPCS | Performed by: NURSE PRACTITIONER

## 2023-07-11 PROCEDURE — 1036F TOBACCO NON-USER: CPT

## 2023-07-11 PROCEDURE — G8417 CALC BMI ABV UP PARAM F/U: HCPCS | Performed by: NURSE PRACTITIONER

## 2023-07-11 PROCEDURE — G8417 CALC BMI ABV UP PARAM F/U: HCPCS

## 2023-07-11 PROCEDURE — 1036F TOBACCO NON-USER: CPT | Performed by: NURSE PRACTITIONER

## 2023-07-11 PROCEDURE — 99212 OFFICE O/P EST SF 10 MIN: CPT

## 2023-07-11 PROCEDURE — 99213 OFFICE O/P EST LOW 20 MIN: CPT | Performed by: NURSE PRACTITIONER

## 2023-07-11 RX ORDER — PHENTERMINE HYDROCHLORIDE 37.5 MG/1
37.5 TABLET ORAL
Qty: 30 TABLET | Refills: 0 | Status: SHIPPED | OUTPATIENT
Start: 2023-07-11 | End: 2023-08-10

## 2023-07-11 ASSESSMENT — ENCOUNTER SYMPTOMS
EYE REDNESS: 0
VOMITING: 0
CHEST TIGHTNESS: 0
SINUS PAIN: 0
SINUS PRESSURE: 0
NAUSEA: 0
TROUBLE SWALLOWING: 0
COUGH: 0
WHEEZING: 0
ABDOMINAL PAIN: 0
EYE ITCHING: 0
SHORTNESS OF BREATH: 0
DIARRHEA: 0
SORE THROAT: 0
EYE DISCHARGE: 0

## 2023-07-11 ASSESSMENT — PATIENT HEALTH QUESTIONNAIRE - PHQ9
SUM OF ALL RESPONSES TO PHQ QUESTIONS 1-9: 0
2. FEELING DOWN, DEPRESSED OR HOPELESS: 0
1. LITTLE INTEREST OR PLEASURE IN DOING THINGS: 0
SUM OF ALL RESPONSES TO PHQ QUESTIONS 1-9: 0
SUM OF ALL RESPONSES TO PHQ9 QUESTIONS 1 & 2: 0
SUM OF ALL RESPONSES TO PHQ QUESTIONS 1-9: 0
SUM OF ALL RESPONSES TO PHQ QUESTIONS 1-9: 0

## 2023-07-11 NOTE — PROGRESS NOTES
[address] [facility] CARE  [place] [address]  Dept: [phone]  Dept Fax: [phone]    Jeb Monreal is a 35 y.o. female who presents today for her medical conditions/complaintsas noted below. Jeb Monreal is c/o of Weight Management (Adipex f/u)        HPI:     15-year-old female patient who presented to clinic today for a 1-month Adipex follow-up. Blood pressure reviewed and is stable. Weight reviewed and patient is up 3 lbs since last appointment, however patient is down 12 lbs overall since starting Adipex. Patient states she is doing well on Adipex, and would like to continue taking medication. She has not been watching her diet as much this last month. She tries to exercise. Denies any side effects from Adipex. No further complaints. Past Medical History:   Diagnosis Date    Infertility, female 1    IVF to have only pregnancy    Iron deficiency anemia     Macromastia     Menorrhagia       Past Surgical History:   Procedure Laterality Date     SECTION  2017    OTHER SURGICAL HISTORY      Keloid scar removal from ears       Family History   Problem Relation Age of Onset    High Blood Pressure Mother     High Blood Pressure Father     High Blood Pressure Sister     Asthma Brother     High Blood Pressure Paternal Grandmother        Social History     Tobacco Use    Smoking status: Never    Smokeless tobacco: Never   Substance Use Topics    Alcohol use: No      Current Outpatient Medications   Medication Sig Dispense Refill    phentermine (ADIPEX-P) 37.5 MG tablet Take 1 tablet by mouth every morning (before breakfast) for 30 days.  Max Daily Amount: 37.5 mg 30 tablet 0    vitamin D (ERGOCALCIFEROL) 1.25 MG (06456 UT) CAPS capsule Take 1 capsule by mouth once a week 12 capsule 1     Current Facility-Administered Medications   Medication Dose Route Frequency Provider Last Rate Last Admin    levonorgestrel

## 2023-07-12 RX ORDER — METRONIDAZOLE 500 MG/1
500 TABLET ORAL 2 TIMES DAILY
Qty: 14 TABLET | Refills: 0 | Status: SHIPPED | OUTPATIENT
Start: 2023-07-12 | End: 2023-07-19

## 2023-07-18 PROBLEM — Z97.5 IUD (INTRAUTERINE DEVICE) IN PLACE: Status: ACTIVE | Noted: 2023-07-18

## 2023-08-10 ENCOUNTER — OFFICE VISIT (OUTPATIENT)
Dept: PRIMARY CARE CLINIC | Age: 33
End: 2023-08-10
Payer: COMMERCIAL

## 2023-08-10 VITALS
RESPIRATION RATE: 16 BRPM | HEART RATE: 79 BPM | SYSTOLIC BLOOD PRESSURE: 126 MMHG | BODY MASS INDEX: 28.53 KG/M2 | WEIGHT: 151 LBS | OXYGEN SATURATION: 100 % | DIASTOLIC BLOOD PRESSURE: 80 MMHG

## 2023-08-10 DIAGNOSIS — E66.3 OVERWEIGHT (BMI 25.0-29.9): ICD-10-CM

## 2023-08-10 DIAGNOSIS — Z71.3 DIETARY COUNSELING: Primary | ICD-10-CM

## 2023-08-10 PROCEDURE — 1036F TOBACCO NON-USER: CPT | Performed by: NURSE PRACTITIONER

## 2023-08-10 PROCEDURE — G8417 CALC BMI ABV UP PARAM F/U: HCPCS | Performed by: NURSE PRACTITIONER

## 2023-08-10 PROCEDURE — 99213 OFFICE O/P EST LOW 20 MIN: CPT | Performed by: NURSE PRACTITIONER

## 2023-08-10 PROCEDURE — G8427 DOCREV CUR MEDS BY ELIG CLIN: HCPCS | Performed by: NURSE PRACTITIONER

## 2023-08-10 RX ORDER — PHENTERMINE HYDROCHLORIDE 37.5 MG/1
37.5 TABLET ORAL
Qty: 30 TABLET | Refills: 0 | Status: SHIPPED | OUTPATIENT
Start: 2023-08-10 | End: 2023-09-09

## 2023-08-10 ASSESSMENT — ENCOUNTER SYMPTOMS
WHEEZING: 0
SHORTNESS OF BREATH: 0
SINUS PRESSURE: 0
EYE REDNESS: 0
NAUSEA: 0
EYE ITCHING: 0
DIARRHEA: 0
SINUS PAIN: 0
CHEST TIGHTNESS: 0
ABDOMINAL PAIN: 0
VOMITING: 0
EYE DISCHARGE: 0
SORE THROAT: 0
TROUBLE SWALLOWING: 0
COUGH: 0

## 2023-08-10 NOTE — PROGRESS NOTES
[address] [place] [address]  Dept: [phone]  Dept Fax: [phone]    Elissa Pena is a 35 y.o. female who presents today for her medical conditions/complaintsas noted below. Elissa Pena is c/o of Weight Management (Adipex f/u)        HPI:     Patient presents for 1 month follow-up  Blood pressure stable  Weight is down 2 pounds since last visit    Patient presents for a 1 month Adipex follow-up. She denies any side effects. She has been going to the gym more often. She has been taking Adipex every other day. She is on a total of 14 pounds since starting Adipex. She would like to continue with the medication. Her goal weight is 140-145. Past Medical History:   Diagnosis Date    Infertility, female 1    IVF to have only pregnancy    Iron deficiency anemia     Macromastia     Menorrhagia       Past Surgical History:   Procedure Laterality Date     SECTION  2017    OTHER SURGICAL HISTORY      Keloid scar removal from ears       Family History   Problem Relation Age of Onset    High Blood Pressure Mother     High Blood Pressure Father     High Blood Pressure Sister     Asthma Brother     High Blood Pressure Paternal Grandmother        Social History     Tobacco Use    Smoking status: Never    Smokeless tobacco: Never   Substance Use Topics    Alcohol use: No      Current Outpatient Medications   Medication Sig Dispense Refill    phentermine (ADIPEX-P) 37.5 MG tablet Take 1 tablet by mouth every morning (before breakfast) for 30 days.  Max Daily Amount: 37.5 mg 30 tablet 0    vitamin D (ERGOCALCIFEROL) 1.25 MG (03968 UT) CAPS capsule Take 1 capsule by mouth once a week 12 capsule 1     Current Facility-Administered Medications   Medication Dose Route Frequency Provider Last Rate Last Admin    levonorgestrel (MIRENA) IUD 52 mg 1 each  1 each IntraUTERine Once Manuel Mak, APRN - CNP   1 each at

## 2023-09-24 DIAGNOSIS — E66.3 OVERWEIGHT (BMI 25.0-29.9): ICD-10-CM

## 2023-09-25 RX ORDER — PHENTERMINE HYDROCHLORIDE 37.5 MG/1
TABLET ORAL
Qty: 30 TABLET | Refills: 0 | Status: SHIPPED | OUTPATIENT
Start: 2023-09-25 | End: 2023-10-25

## 2023-11-13 ENCOUNTER — OFFICE VISIT (OUTPATIENT)
Dept: PRIMARY CARE CLINIC | Age: 33
End: 2023-11-13
Payer: COMMERCIAL

## 2023-11-13 VITALS
WEIGHT: 148.2 LBS | BODY MASS INDEX: 28 KG/M2 | OXYGEN SATURATION: 100 % | RESPIRATION RATE: 14 BRPM | HEART RATE: 72 BPM | SYSTOLIC BLOOD PRESSURE: 114 MMHG | DIASTOLIC BLOOD PRESSURE: 76 MMHG

## 2023-11-13 DIAGNOSIS — N92.0 MENORRHAGIA WITH REGULAR CYCLE: ICD-10-CM

## 2023-11-13 DIAGNOSIS — D50.8 OTHER IRON DEFICIENCY ANEMIA: Primary | ICD-10-CM

## 2023-11-13 DIAGNOSIS — E66.3 OVERWEIGHT (BMI 25.0-29.9): ICD-10-CM

## 2023-11-13 DIAGNOSIS — Z23 NEED FOR INFLUENZA VACCINATION: ICD-10-CM

## 2023-11-13 PROCEDURE — 99214 OFFICE O/P EST MOD 30 MIN: CPT | Performed by: NURSE PRACTITIONER

## 2023-11-13 PROCEDURE — G8482 FLU IMMUNIZE ORDER/ADMIN: HCPCS | Performed by: NURSE PRACTITIONER

## 2023-11-13 PROCEDURE — 90674 CCIIV4 VAC NO PRSV 0.5 ML IM: CPT | Performed by: NURSE PRACTITIONER

## 2023-11-13 PROCEDURE — G8417 CALC BMI ABV UP PARAM F/U: HCPCS | Performed by: NURSE PRACTITIONER

## 2023-11-13 PROCEDURE — 90471 IMMUNIZATION ADMIN: CPT | Performed by: NURSE PRACTITIONER

## 2023-11-13 PROCEDURE — G8427 DOCREV CUR MEDS BY ELIG CLIN: HCPCS | Performed by: NURSE PRACTITIONER

## 2023-11-13 PROCEDURE — 1036F TOBACCO NON-USER: CPT | Performed by: NURSE PRACTITIONER

## 2023-11-13 RX ORDER — PHENTERMINE HYDROCHLORIDE 37.5 MG/1
TABLET ORAL
Qty: 30 TABLET | Refills: 0 | Status: SHIPPED | OUTPATIENT
Start: 2023-11-13 | End: 2023-12-13

## 2023-11-13 ASSESSMENT — ENCOUNTER SYMPTOMS
NAUSEA: 0
VOMITING: 0
TROUBLE SWALLOWING: 0
DIARRHEA: 0
EYE REDNESS: 0
SINUS PAIN: 0
SINUS PRESSURE: 0
COUGH: 0
SORE THROAT: 0
EYE DISCHARGE: 0
ABDOMINAL PAIN: 0
CHEST TIGHTNESS: 0
EYE ITCHING: 0
SHORTNESS OF BREATH: 0
WHEEZING: 0

## 2023-11-13 ASSESSMENT — PATIENT HEALTH QUESTIONNAIRE - PHQ9
SUM OF ALL RESPONSES TO PHQ QUESTIONS 1-9: 0
SUM OF ALL RESPONSES TO PHQ QUESTIONS 1-9: 0
SUM OF ALL RESPONSES TO PHQ9 QUESTIONS 1 & 2: 0
SUM OF ALL RESPONSES TO PHQ QUESTIONS 1-9: 0
SUM OF ALL RESPONSES TO PHQ QUESTIONS 1-9: 0
2. FEELING DOWN, DEPRESSED OR HOPELESS: 0
1. LITTLE INTEREST OR PLEASURE IN DOING THINGS: 0

## 2023-11-13 NOTE — PROGRESS NOTES
Vaccine Information Sheet, \"Influenza - Inactivated\"  given to Sepideh Car, or parent/legal guardian of  Sepideh Car and verbalized understanding. Patient responses:    Have you ever had a reaction to a flu vaccine? No  Are you able to eat eggs without adverse effects? Yes  Do you have any current illness? No  Have you ever had [place] Syndrome? No    After obtaining consent, and per orders of Artis De Paz CNP, injection of Flucelvax given in Right deltoid by Bryan Montez MA. Patient instructed to remain in clinic for 20 minutes afterwards, and to report any adverse reaction to me immediately.
continue with healthy lifestyle changes    F/u in 3 months   Orders Placed This Encounter   Procedures    Influenza, FLUCELVAX, (age 10 mo+), IM, Preservative Free, 0.5 mL     Orders Placed This Encounter   Medications    phentermine (ADIPEX-P) 37.5 MG tablet     Sig: TAKE ONE TABLET BY MOUTH EVERY MORNING (BEFORE BREAKFAST)     Dispense:  30 tablet     Refill:  0       Patient given educational materials - seepatient instructions. Discussed use, benefit, and side effects of prescribed medications. All patient questions answered. Pt voiced understanding. Reviewed health maintenance. Instructed to continue current medications, diet and exercise. Patient agreedwith treatment plan. Follow up as directed.       Electronically signed by SALIMA Cifuentes CNP on 11/13/2023at 8:56 AM

## 2023-12-11 DIAGNOSIS — E66.3 OVERWEIGHT (BMI 25.0-29.9): ICD-10-CM

## 2023-12-11 DIAGNOSIS — N76.0 ACUTE VAGINITIS: ICD-10-CM

## 2023-12-11 RX ORDER — PHENTERMINE HYDROCHLORIDE 37.5 MG/1
TABLET ORAL
Qty: 30 TABLET | Refills: 0 | Status: SHIPPED | OUTPATIENT
Start: 2023-12-11 | End: 2024-01-11

## 2023-12-12 RX ORDER — METRONIDAZOLE 500 MG/1
TABLET ORAL
Qty: 14 TABLET | Refills: 0 | OUTPATIENT
Start: 2023-12-12

## 2024-02-01 DIAGNOSIS — E66.3 OVERWEIGHT (BMI 25.0-29.9): ICD-10-CM

## 2024-02-01 RX ORDER — PHENTERMINE HYDROCHLORIDE 37.5 MG/1
TABLET ORAL
Qty: 30 TABLET | Refills: 0 | Status: SHIPPED | OUTPATIENT
Start: 2024-02-01 | End: 2024-03-01

## 2024-03-04 ASSESSMENT — PATIENT HEALTH QUESTIONNAIRE - PHQ9
1. LITTLE INTEREST OR PLEASURE IN DOING THINGS: NOT AT ALL
2. FEELING DOWN, DEPRESSED OR HOPELESS: NOT AT ALL
SUM OF ALL RESPONSES TO PHQ9 QUESTIONS 1 & 2: 0
SUM OF ALL RESPONSES TO PHQ QUESTIONS 1-9: 0
SUM OF ALL RESPONSES TO PHQ9 QUESTIONS 1 & 2: 0
2. FEELING DOWN, DEPRESSED OR HOPELESS: 0
SUM OF ALL RESPONSES TO PHQ QUESTIONS 1-9: 0
SUM OF ALL RESPONSES TO PHQ QUESTIONS 1-9: 0
1. LITTLE INTEREST OR PLEASURE IN DOING THINGS: 0
SUM OF ALL RESPONSES TO PHQ QUESTIONS 1-9: 0

## 2024-03-05 ENCOUNTER — OFFICE VISIT (OUTPATIENT)
Dept: PRIMARY CARE CLINIC | Age: 34
End: 2024-03-05
Payer: COMMERCIAL

## 2024-03-05 VITALS
SYSTOLIC BLOOD PRESSURE: 122 MMHG | RESPIRATION RATE: 14 BRPM | OXYGEN SATURATION: 100 % | BODY MASS INDEX: 28.57 KG/M2 | HEART RATE: 72 BPM | DIASTOLIC BLOOD PRESSURE: 80 MMHG | WEIGHT: 151.2 LBS

## 2024-03-05 DIAGNOSIS — E66.3 OVERWEIGHT (BMI 25.0-29.9): ICD-10-CM

## 2024-03-05 DIAGNOSIS — Z71.3 DIETARY COUNSELING: Primary | ICD-10-CM

## 2024-03-05 PROCEDURE — G8417 CALC BMI ABV UP PARAM F/U: HCPCS | Performed by: NURSE PRACTITIONER

## 2024-03-05 PROCEDURE — 1036F TOBACCO NON-USER: CPT | Performed by: NURSE PRACTITIONER

## 2024-03-05 PROCEDURE — 99213 OFFICE O/P EST LOW 20 MIN: CPT | Performed by: NURSE PRACTITIONER

## 2024-03-05 PROCEDURE — G8482 FLU IMMUNIZE ORDER/ADMIN: HCPCS | Performed by: NURSE PRACTITIONER

## 2024-03-05 PROCEDURE — G8427 DOCREV CUR MEDS BY ELIG CLIN: HCPCS | Performed by: NURSE PRACTITIONER

## 2024-03-05 ASSESSMENT — ENCOUNTER SYMPTOMS
CHEST TIGHTNESS: 0
SHORTNESS OF BREATH: 0
VOMITING: 0
COUGH: 0
EYE DISCHARGE: 0
EYE ITCHING: 0
SINUS PAIN: 0
TROUBLE SWALLOWING: 0
ABDOMINAL PAIN: 0
SINUS PRESSURE: 0
SORE THROAT: 0
DIARRHEA: 0
EYE REDNESS: 0
NAUSEA: 0
WHEEZING: 0

## 2024-03-05 NOTE — PROGRESS NOTES
[facility]  [facility] PRIMARY CARE  [address] [place] [address] [address]  [address]  [facility] [address]  Dept: [phone]  Dept Fax: [phone]    Norman Dumont is a 33 y.o. female who presents today for her medical conditions/complaintsas noted below.  Norman Dumont is c/o of Medication Check (Adipex f/u)        HPI:     Patient presents for an office visit  Blood pressure stable  Weight is up 3 pounds since last visit    Patient presents for an office visit to discuss weight management  She has had a lot going on in the last 3 months. She is taking the adipex. She did stop in the month of January. She is settling down in new place. She is back in the gym.  She is ready to get back on track.  She would like to continue with Adipex.  Starting weight 157  Goal is 140-145        Past Medical History:   Diagnosis Date    Infertility, female     IVF to have only pregnancy    Iron deficiency anemia     Macromastia     Menorrhagia       Past Surgical History:   Procedure Laterality Date     SECTION      OTHER SURGICAL HISTORY      Keloid scar removal from ears       Family History   Problem Relation Age of Onset    High Blood Pressure Mother     High Blood Pressure Father     High Blood Pressure Sister     Asthma Brother     High Blood Pressure Paternal Grandmother        Social History     Tobacco Use    Smoking status: Never    Smokeless tobacco: Never   Substance Use Topics    Alcohol use: No      Current Outpatient Medications   Medication Sig Dispense Refill    vitamin D (ERGOCALCIFEROL) 1.25 MG (51388 UT) CAPS capsule Take 1 capsule by mouth once a week 12 capsule 1     Current Facility-Administered Medications   Medication Dose Route Frequency Provider Last Rate Last Admin    levonorgestrel (MIRENA) IUD 52 mg 1 each  1 each IntraUTERine Once Matilde Franco APRN - CNP   1 each at 23 1619     Allergies   Allergen Reactions    No Known Allergies        Health Maintenance

## 2024-04-09 SDOH — ECONOMIC STABILITY: INCOME INSECURITY: HOW HARD IS IT FOR YOU TO PAY FOR THE VERY BASICS LIKE FOOD, HOUSING, MEDICAL CARE, AND HEATING?: NOT HARD AT ALL

## 2024-04-09 SDOH — ECONOMIC STABILITY: FOOD INSECURITY: WITHIN THE PAST 12 MONTHS, YOU WORRIED THAT YOUR FOOD WOULD RUN OUT BEFORE YOU GOT MONEY TO BUY MORE.: NEVER TRUE

## 2024-04-09 SDOH — ECONOMIC STABILITY: FOOD INSECURITY: WITHIN THE PAST 12 MONTHS, THE FOOD YOU BOUGHT JUST DIDN'T LAST AND YOU DIDN'T HAVE MONEY TO GET MORE.: NEVER TRUE

## 2024-04-10 ENCOUNTER — OFFICE VISIT (OUTPATIENT)
Dept: PRIMARY CARE CLINIC | Age: 34
End: 2024-04-10
Payer: COMMERCIAL

## 2024-04-10 VITALS
HEART RATE: 65 BPM | DIASTOLIC BLOOD PRESSURE: 62 MMHG | WEIGHT: 149.6 LBS | OXYGEN SATURATION: 99 % | SYSTOLIC BLOOD PRESSURE: 104 MMHG | BODY MASS INDEX: 28.27 KG/M2 | RESPIRATION RATE: 14 BRPM

## 2024-04-10 DIAGNOSIS — E66.3 OVERWEIGHT (BMI 25.0-29.9): ICD-10-CM

## 2024-04-10 DIAGNOSIS — Z71.3 DIETARY COUNSELING: Primary | ICD-10-CM

## 2024-04-10 PROCEDURE — 99213 OFFICE O/P EST LOW 20 MIN: CPT | Performed by: NURSE PRACTITIONER

## 2024-04-10 PROCEDURE — 1036F TOBACCO NON-USER: CPT | Performed by: NURSE PRACTITIONER

## 2024-04-10 PROCEDURE — G8417 CALC BMI ABV UP PARAM F/U: HCPCS | Performed by: NURSE PRACTITIONER

## 2024-04-10 PROCEDURE — G8427 DOCREV CUR MEDS BY ELIG CLIN: HCPCS | Performed by: NURSE PRACTITIONER

## 2024-04-10 RX ORDER — PHENTERMINE HYDROCHLORIDE 37.5 MG/1
37.5 TABLET ORAL
Qty: 30 TABLET | Refills: 0 | Status: SHIPPED | OUTPATIENT
Start: 2024-04-10 | End: 2024-05-09

## 2024-04-10 ASSESSMENT — ENCOUNTER SYMPTOMS
CHEST TIGHTNESS: 0
SINUS PRESSURE: 0
EYE ITCHING: 0
COUGH: 0
WHEEZING: 0
ABDOMINAL PAIN: 0
SINUS PAIN: 0
DIARRHEA: 0
VOMITING: 0
EYE REDNESS: 0
EYE DISCHARGE: 0
TROUBLE SWALLOWING: 0
SHORTNESS OF BREATH: 0
SORE THROAT: 0
NAUSEA: 0

## 2024-04-10 ASSESSMENT — PATIENT HEALTH QUESTIONNAIRE - PHQ9
SUM OF ALL RESPONSES TO PHQ QUESTIONS 1-9: 0
2. FEELING DOWN, DEPRESSED OR HOPELESS: NOT AT ALL
SUM OF ALL RESPONSES TO PHQ9 QUESTIONS 1 & 2: 0
1. LITTLE INTEREST OR PLEASURE IN DOING THINGS: NOT AT ALL
SUM OF ALL RESPONSES TO PHQ QUESTIONS 1-9: 0

## 2024-04-10 NOTE — PROGRESS NOTES
Known Allergies        Health Maintenance   Topic Date Due    Varicella vaccine (1 of 2 - 2-dose childhood series) Never done    Hepatitis B vaccine (3 of 3 - 3-dose series) 11/08/2000    COVID-19 Vaccine (4 - 2023-24 season) 09/01/2023    Depression Screen  03/04/2025    Cervical cancer screen  11/18/2027    DTaP/Tdap/Td vaccine (4 - Td or Tdap) 11/15/2028    Flu vaccine  Completed    Hepatitis C screen  Completed    HIV screen  Completed    Hepatitis A vaccine  Aged Out    Hib vaccine  Aged Out    HPV vaccine  Aged Out    Polio vaccine  Aged Out    Meningococcal (ACWY) vaccine  Aged Out    Pneumococcal 0-64 years Vaccine  Aged Out       :     Review of Systems   Constitutional:  Negative for chills, fatigue and fever.   HENT:  Negative for ear discharge, ear pain, sinus pressure, sinus pain, sore throat and trouble swallowing.    Eyes:  Negative for discharge, redness and itching.   Respiratory:  Negative for cough, chest tightness, shortness of breath and wheezing.    Cardiovascular:  Negative for chest pain.   Gastrointestinal:  Negative for abdominal pain, diarrhea, nausea and vomiting.   Genitourinary:  Negative for difficulty urinating.   Musculoskeletal:  Negative for arthralgias and neck pain.   Skin:  Negative for rash.   Neurological:  Negative for dizziness, weakness, light-headedness and headaches.   All other systems reviewed and are negative.      Objective:     Physical Exam  Constitutional:       General: She is not in acute distress.     Appearance: Normal appearance. She is normal weight. She is not ill-appearing.   HENT:      Head: Normocephalic and atraumatic.      Nose: Nose normal. No congestion or rhinorrhea.      Mouth/Throat:      Mouth: Mucous membranes are moist.      Pharynx: Oropharynx is clear. No oropharyngeal exudate or posterior oropharyngeal erythema.   Eyes:      Extraocular Movements: Extraocular movements intact.      Conjunctiva/sclera: Conjunctivae normal.      Pupils: Pupils

## 2024-04-19 ENCOUNTER — OFFICE VISIT (OUTPATIENT)
Dept: OBGYN CLINIC | Age: 34
End: 2024-04-19
Payer: COMMERCIAL

## 2024-04-19 ENCOUNTER — HOSPITAL ENCOUNTER (OUTPATIENT)
Age: 34
Setting detail: SPECIMEN
Discharge: HOME | End: 2024-04-19

## 2024-04-19 VITALS
HEIGHT: 61 IN | BODY MASS INDEX: 27.41 KG/M2 | WEIGHT: 145.2 LBS | HEART RATE: 68 BPM | DIASTOLIC BLOOD PRESSURE: 71 MMHG | SYSTOLIC BLOOD PRESSURE: 104 MMHG

## 2024-04-19 DIAGNOSIS — N89.8 VAGINAL DISCHARGE: Primary | ICD-10-CM

## 2024-04-19 DIAGNOSIS — N89.8 VAGINAL DISCHARGE: ICD-10-CM

## 2024-04-19 LAB
C TRACH DNA SPEC QL PROBE+SIG AMP: NORMAL
CANDIDA SPECIES: NEGATIVE
GARDNERELLA VAGINALIS: POSITIVE
N GONORRHOEA DNA SPEC QL PROBE+SIG AMP: NORMAL
SOURCE: ABNORMAL
SPECIMEN DESCRIPTION: NORMAL
TRICHOMONAS: NEGATIVE

## 2024-04-19 PROCEDURE — G8427 DOCREV CUR MEDS BY ELIG CLIN: HCPCS

## 2024-04-19 PROCEDURE — 99212 OFFICE O/P EST SF 10 MIN: CPT

## 2024-04-19 PROCEDURE — 1036F TOBACCO NON-USER: CPT

## 2024-04-19 PROCEDURE — G8417 CALC BMI ABV UP PARAM F/U: HCPCS

## 2024-04-19 NOTE — PROGRESS NOTES
[facility], [facility] [place]  [address]  [address]  [facility] [address]  Dept: [phone]     Norman Dumont is a 33 y.o. y.o. female who presents today for had concerns including Other (Vaginal odor, discharge ).    Vaginal infections in past year: Tested positive for vaginitis in July  Menstrual pattern: She had been bleeding regular and light  Contraception: IUD  No change in partners   [place] is not a trigger    Allergies   Allergen Reactions    No Known Allergies       Patient Active Problem List   Diagnosis    Vitamin D deficiency    Chronic bilateral thoracic back pain    Macromastia    Menorrhagia with regular cycle     delivery delivered    Female infertility due to block of fallopian tube    Keloid scar    Obesity, Class II, BMI 35-39.9    Polyp of cervix    Class 1 obesity due to excess calories without serious comorbidity with body mass index (BMI) of 32.0 to 32.9 in adult    Iron deficiency anemia    HSV-2 seropositive    Mirena IUD placed 23      Review of Systems:  Review of Systems   Genitourinary:  Positive for vaginal discharge. Negative for decreased urine volume, difficulty urinating, dyspareunia, dysuria, frequency, hematuria, menstrual problem, urgency and vaginal pain.   All other systems reviewed and are negative.   has vaginal odor, itching, tenderness/pain    /71 (Site: Right Upper Arm, Position: Sitting, Cuff Size: Medium Adult)   Pulse 68   Ht 1.549 m (5' 1\")   Wt 65.9 kg (145 lb 3.2 oz)   LMP 2024   BMI 27.44 kg/m²   Physical Exam  Constitutional:       General: She is not in acute distress.     Appearance: Normal appearance.   Genitourinary:      Vulva and urethral meatus normal.      Right Labia: No tenderness, lesions or skin changes.     Left Labia: No tenderness, lesions or skin changes.     Vaginal discharge present.   Pulmonary:      Effort: Pulmonary effort is normal.

## 2024-04-22 LAB
C TRACH DNA SPEC QL PROBE+SIG AMP: NEGATIVE
N GONORRHOEA DNA SPEC QL PROBE+SIG AMP: NEGATIVE
SPECIMEN DESCRIPTION: NORMAL

## 2024-05-30 ENCOUNTER — OFFICE VISIT (OUTPATIENT)
Dept: PRIMARY CARE CLINIC | Age: 34
End: 2024-05-30
Payer: COMMERCIAL

## 2024-05-30 VITALS
WEIGHT: 146.2 LBS | DIASTOLIC BLOOD PRESSURE: 74 MMHG | BODY MASS INDEX: 27.62 KG/M2 | SYSTOLIC BLOOD PRESSURE: 114 MMHG | HEART RATE: 57 BPM | OXYGEN SATURATION: 100 % | RESPIRATION RATE: 12 BRPM

## 2024-05-30 DIAGNOSIS — Z71.3 DIETARY COUNSELING: Primary | ICD-10-CM

## 2024-05-30 DIAGNOSIS — E66.3 OVERWEIGHT (BMI 25.0-29.9): ICD-10-CM

## 2024-05-30 PROCEDURE — G8417 CALC BMI ABV UP PARAM F/U: HCPCS | Performed by: NURSE PRACTITIONER

## 2024-05-30 PROCEDURE — 99213 OFFICE O/P EST LOW 20 MIN: CPT | Performed by: NURSE PRACTITIONER

## 2024-05-30 PROCEDURE — 1036F TOBACCO NON-USER: CPT | Performed by: NURSE PRACTITIONER

## 2024-05-30 PROCEDURE — G8427 DOCREV CUR MEDS BY ELIG CLIN: HCPCS | Performed by: NURSE PRACTITIONER

## 2024-05-30 RX ORDER — PHENTERMINE HYDROCHLORIDE 37.5 MG/1
37.5 TABLET ORAL
Qty: 30 TABLET | Refills: 0 | Status: SHIPPED | OUTPATIENT
Start: 2024-05-30 | End: 2024-06-28

## 2024-05-30 ASSESSMENT — ENCOUNTER SYMPTOMS
EYE REDNESS: 0
WHEEZING: 0
TROUBLE SWALLOWING: 0
ABDOMINAL PAIN: 0
SINUS PAIN: 0
DIARRHEA: 0
SINUS PRESSURE: 0
CHEST TIGHTNESS: 0
EYE DISCHARGE: 0
SHORTNESS OF BREATH: 0
VOMITING: 0
SORE THROAT: 0
EYE ITCHING: 0
COUGH: 0
NAUSEA: 0

## 2024-05-30 NOTE — PROGRESS NOTES
Conjunctiva/sclera: Conjunctivae normal.      Pupils: Pupils are equal, round, and reactive to light.   Cardiovascular:      Rate and Rhythm: Normal rate and regular rhythm.      Pulses: Normal pulses.      Heart sounds: Normal heart sounds. No murmur heard.  Pulmonary:      Effort: Pulmonary effort is normal. No respiratory distress.      Breath sounds: Normal breath sounds. No wheezing.   Abdominal:      General: Abdomen is flat. Bowel sounds are normal. There is no distension.      Palpations: Abdomen is soft.      Tenderness: There is no abdominal tenderness. There is no guarding.   Musculoskeletal:         General: Normal range of motion.      Cervical back: Normal range of motion and neck supple.   Skin:     General: Skin is warm and dry.      Capillary Refill: Capillary refill takes less than 2 seconds.   Neurological:      General: No focal deficit present.      Mental Status: She is alert and oriented to person, place, and time.      Motor: No weakness.      Coordination: Coordination normal.      Gait: Gait normal.   Psychiatric:         Mood and Affect: Mood normal.         Behavior: Behavior normal.         Thought Content: Thought content normal.       /74   Pulse 57   Resp 12   Wt 66.3 kg (146 lb 3.2 oz)   SpO2 100%   BMI 27.62 kg/m²     Assessment:   Assessment & Plan    Diagnosis Orders   1. Dietary counseling        2. Overweight (BMI 25.0-29.9)  phentermine (ADIPEX-P) 37.5 MG tablet          :      Return in about 3 months (around 8/30/2024) for adipex.  1-2.  Dietary counseling and overweight  Weight is down 11 pounds since starting medication.  We did discuss the importance of increasing her protein due to the amount of exercise that she does.  We discussed different protein options.  Continue with phentermine 37.5 mg p.o. daily    Okay to follow-up in 3 months  Orders Placed This Encounter   Medications    phentermine (ADIPEX-P) 37.5 MG tablet     Sig: Take 1 tablet by mouth every

## 2024-07-10 DIAGNOSIS — E66.3 OVERWEIGHT (BMI 25.0-29.9): ICD-10-CM

## 2024-07-11 ENCOUNTER — HOSPITAL ENCOUNTER (OUTPATIENT)
Age: 34
Setting detail: SPECIMEN
Discharge: HOME | End: 2024-07-11

## 2024-07-11 ENCOUNTER — OFFICE VISIT (OUTPATIENT)
Dept: OBGYN CLINIC | Age: 34
End: 2024-07-11
Payer: COMMERCIAL

## 2024-07-11 VITALS
WEIGHT: 150 LBS | HEART RATE: 58 BPM | SYSTOLIC BLOOD PRESSURE: 101 MMHG | HEIGHT: 61 IN | DIASTOLIC BLOOD PRESSURE: 69 MMHG | BODY MASS INDEX: 28.32 KG/M2

## 2024-07-11 DIAGNOSIS — N89.8 VAGINAL DISCHARGE: ICD-10-CM

## 2024-07-11 DIAGNOSIS — N76.1 CHRONIC VAGINITIS: ICD-10-CM

## 2024-07-11 DIAGNOSIS — A49.3 MYCOPLASMA INFECTION: ICD-10-CM

## 2024-07-11 DIAGNOSIS — N89.8 VAGINAL ODOR: ICD-10-CM

## 2024-07-11 DIAGNOSIS — Z97.5 IUD (INTRAUTERINE DEVICE) IN PLACE: ICD-10-CM

## 2024-07-11 DIAGNOSIS — Z01.411 ABNORMAL FEMALE PELVIC EXAM: ICD-10-CM

## 2024-07-11 DIAGNOSIS — Z01.419 ENCOUNTER FOR WELL WOMAN EXAM WITH ROUTINE GYNECOLOGICAL EXAM: Primary | ICD-10-CM

## 2024-07-11 DIAGNOSIS — Z30.431 IUD CHECK UP: ICD-10-CM

## 2024-07-11 LAB
CANDIDA SPECIES: NEGATIVE
GARDNERELLA VAGINALIS: POSITIVE
SOURCE: ABNORMAL
TRICHOMONAS: NEGATIVE

## 2024-07-11 PROCEDURE — 99395 PREV VISIT EST AGE 18-39: CPT

## 2024-07-11 RX ORDER — PHENTERMINE HYDROCHLORIDE 37.5 MG/1
37.5 TABLET ORAL
Qty: 30 TABLET | Refills: 0 | Status: SHIPPED | OUTPATIENT
Start: 2024-07-11 | End: 2024-08-10

## 2024-07-11 NOTE — PROGRESS NOTES
recommendations reviewed as appropriate    No follow-ups on file.    Counseling Completed:  -Discussed recommendations to repeat pap as per American Society for Colposcopy and Cervical Pathology guidelines. Encouraged annual gynecologic evaluation.  -Breast cancer screening with mammography typically begins at age 40 but may be indicated sooner based on family history and patient's individual risk factors.  -Discussed birth control and barrier recommendations. Discussed STD counseling and prevention.         Routine health maintenance per patients PCP encouraged    Return to this office annually and PRN.    The patient was seen and evaluated face to face by SALIMA Bryant CNP   7/24/2024, 4:18 PM    On this date I have spent 30 minutes   -preparing to see the patient by reviewing the electronic medical record as well as  -obtaining and/or reviewing separately obtained history,   -performing a medically appropriate examination and/or evaluation,   -ordering medication, tests or procedures and   -counseling and educating the patient/family/caregiver,   -referring and communicating with other health care professionals,   -documenting clinical information in the electronic or other health records,   -independently interpreting results and communicating results to the patient family caregiver  -care coordination     Please note that portions of this note may have been generated using voice recognition dictation software. Although every effort was made to ensure the accuracy of this automated transcription, some errors in transcription may have occurred. Please contact our office with any questions.

## 2024-07-12 RX ORDER — METRONIDAZOLE 500 MG/1
500 TABLET ORAL 2 TIMES DAILY
Qty: 14 TABLET | Refills: 0 | Status: SHIPPED | OUTPATIENT
Start: 2024-07-12 | End: 2024-07-19

## 2024-07-12 RX ORDER — METRONIDAZOLE 7.5 MG/G
GEL VAGINAL
Qty: 1 EACH | Refills: 3 | Status: SHIPPED | OUTPATIENT
Start: 2024-07-12

## 2024-07-18 LAB — MYCOPLAS+UREOPLAS SPEC CULT: NORMAL

## 2024-07-18 RX ORDER — DOXYCYCLINE 100 MG/1
100 TABLET ORAL 2 TIMES DAILY
Qty: 14 TABLET | Refills: 0 | Status: SHIPPED | OUTPATIENT
Start: 2024-07-18 | End: 2024-07-19 | Stop reason: SDUPTHER

## 2024-07-18 RX ORDER — AZITHROMYCIN 500 MG/1
TABLET, FILM COATED ORAL
Qty: 5 TABLET | Refills: 0 | Status: SHIPPED | OUTPATIENT
Start: 2024-07-18 | End: 2024-07-19 | Stop reason: SDUPTHER

## 2024-07-19 RX ORDER — AZITHROMYCIN 500 MG/1
TABLET, FILM COATED ORAL
Qty: 5 TABLET | Refills: 0 | Status: SHIPPED | OUTPATIENT
Start: 2024-07-19

## 2024-07-19 RX ORDER — DOXYCYCLINE 100 MG/1
100 TABLET ORAL 2 TIMES DAILY
Qty: 14 TABLET | Refills: 0 | Status: SHIPPED | OUTPATIENT
Start: 2024-07-19 | End: 2024-07-26

## 2024-08-21 ENCOUNTER — HOSPITAL ENCOUNTER (OUTPATIENT)
Dept: ULTRASOUND IMAGING | Age: 34
Discharge: HOME | End: 2024-08-23
Payer: COMMERCIAL

## 2024-08-21 DIAGNOSIS — Z01.411 ABNORMAL FEMALE PELVIC EXAM: ICD-10-CM

## 2024-08-21 PROCEDURE — 76856 US EXAM PELVIC COMPLETE: CPT

## 2024-08-25 DIAGNOSIS — E66.3 OVERWEIGHT (BMI 25.0-29.9): ICD-10-CM

## 2024-08-26 RX ORDER — PHENTERMINE HYDROCHLORIDE 37.5 MG/1
TABLET ORAL
Qty: 30 TABLET | Refills: 0 | Status: SHIPPED | OUTPATIENT
Start: 2024-08-26 | End: 2024-09-25

## 2024-09-05 ENCOUNTER — OFFICE VISIT (OUTPATIENT)
Dept: PRIMARY CARE CLINIC | Age: 34
End: 2024-09-05

## 2024-09-05 VITALS
OXYGEN SATURATION: 100 % | RESPIRATION RATE: 12 BRPM | WEIGHT: 148.6 LBS | SYSTOLIC BLOOD PRESSURE: 116 MMHG | HEART RATE: 81 BPM | DIASTOLIC BLOOD PRESSURE: 74 MMHG | BODY MASS INDEX: 28.08 KG/M2

## 2024-09-05 DIAGNOSIS — Z71.3 DIETARY COUNSELING: Primary | ICD-10-CM

## 2024-09-05 DIAGNOSIS — E66.3 OVERWEIGHT (BMI 25.0-29.9): ICD-10-CM

## 2024-09-05 PROBLEM — E66.812 OBESITY, CLASS II, BMI 35-39.9: Status: RESOLVED | Noted: 2019-09-19 | Resolved: 2024-09-05

## 2024-09-05 PROBLEM — E66.9 OBESITY, CLASS II, BMI 35-39.9: Status: RESOLVED | Noted: 2019-09-19 | Resolved: 2024-09-05

## 2024-09-05 ASSESSMENT — PATIENT HEALTH QUESTIONNAIRE - PHQ9
SUM OF ALL RESPONSES TO PHQ QUESTIONS 1-9: 0
SUM OF ALL RESPONSES TO PHQ9 QUESTIONS 1 & 2: 0
SUM OF ALL RESPONSES TO PHQ QUESTIONS 1-9: 0
2. FEELING DOWN, DEPRESSED OR HOPELESS: NOT AT ALL
SUM OF ALL RESPONSES TO PHQ QUESTIONS 1-9: 0
1. LITTLE INTEREST OR PLEASURE IN DOING THINGS: NOT AT ALL
SUM OF ALL RESPONSES TO PHQ QUESTIONS 1-9: 0

## 2024-09-05 ASSESSMENT — ENCOUNTER SYMPTOMS
NAUSEA: 0
VOMITING: 0
COUGH: 0
TROUBLE SWALLOWING: 0
EYE ITCHING: 0
SORE THROAT: 0
SINUS PRESSURE: 0
EYE REDNESS: 0
CHEST TIGHTNESS: 0
EYE DISCHARGE: 0
ABDOMINAL PAIN: 0
SHORTNESS OF BREATH: 0
WHEEZING: 0
SINUS PAIN: 0
DIARRHEA: 0

## 2024-09-05 NOTE — PROGRESS NOTES
[facility]  [facility] PRIMARY CARE  [address] [place] [address] [address]  [address]  [facility] [address]  Dept: [phone]  Dept Fax: [phone]    Norman Dumont is a 34 y.o. female who presents today for her medical conditions/complaintsas noted below.  Norman Dumont is c/o of Weight Management (3 mo f/u, adipex)    HPI:     Pt presents for a follow up   Blood pressure stable  Weight is down 2 pounds    Patient presents for follow-up for adipex  She is doing well on Adipex.  She continues to exercise regularly.  She does watch her diet.  She does admit that she will often go all day without eating and only eat 1 meal.  She does struggle to get her protein in.  She often just does not feel hungry.  She tries to eat only when she is hungry        Past Medical History:   Diagnosis Date     delivery delivered 2017    Formatting of this note might be different from the original.  For fetal intolerance of labor      Infertility, female     IVF to have only pregnancy    Iron deficiency anemia     Macromastia     Menorrhagia       Past Surgical History:   Procedure Laterality Date     SECTION      OTHER SURGICAL HISTORY      Keloid scar removal from ears       Family History   Problem Relation Age of Onset    High Blood Pressure Mother     High Blood Pressure Father     High Blood Pressure Sister     Asthma Brother     High Blood Pressure Paternal Grandmother        Social History     Tobacco Use    Smoking status: Never    Smokeless tobacco: Never   Substance Use Topics    Alcohol use: No      Current Outpatient Medications   Medication Sig Dispense Refill    phentermine (ADIPEX-P) 37.5 MG tablet TAKE 1 TABLET BY MOUTH EVERY MORNING BEFORE BREAKFAST, MAX 1 TABLET A DAY 30 tablet 0    vitamin D (ERGOCALCIFEROL) 1.25 MG (65762 UT) CAPS capsule Take 1 capsule by mouth once a week 12 capsule 1     Current Facility-Administered Medications   Medication Dose Route Frequency Provider

## 2024-10-20 DIAGNOSIS — E66.3 OVERWEIGHT (BMI 25.0-29.9): ICD-10-CM

## 2024-10-21 RX ORDER — PHENTERMINE HYDROCHLORIDE 37.5 MG/1
37.5 TABLET ORAL
Qty: 30 TABLET | Refills: 0 | Status: SHIPPED | OUTPATIENT
Start: 2024-10-21 | End: 2024-11-20

## 2024-11-26 DIAGNOSIS — E66.3 OVERWEIGHT (BMI 25.0-29.9): ICD-10-CM

## 2024-11-26 RX ORDER — PHENTERMINE HYDROCHLORIDE 37.5 MG/1
37.5 TABLET ORAL
Qty: 30 TABLET | Refills: 0 | Status: SHIPPED | OUTPATIENT
Start: 2024-11-26 | End: 2024-12-26

## 2024-12-05 ENCOUNTER — TELEPHONE (OUTPATIENT)
Dept: PRIMARY CARE CLINIC | Age: 34
End: 2024-12-05

## 2024-12-09 ENCOUNTER — OFFICE VISIT (OUTPATIENT)
Dept: PRIMARY CARE CLINIC | Age: 34
End: 2024-12-09
Payer: COMMERCIAL

## 2024-12-09 ENCOUNTER — HOSPITAL ENCOUNTER (OUTPATIENT)
Age: 34
Setting detail: SPECIMEN
Discharge: HOME | End: 2024-12-09

## 2024-12-09 VITALS
OXYGEN SATURATION: 99 % | WEIGHT: 153.8 LBS | HEART RATE: 74 BPM | SYSTOLIC BLOOD PRESSURE: 112 MMHG | DIASTOLIC BLOOD PRESSURE: 72 MMHG | RESPIRATION RATE: 12 BRPM | BODY MASS INDEX: 29.06 KG/M2

## 2024-12-09 DIAGNOSIS — E53.8 VITAMIN B 12 DEFICIENCY: ICD-10-CM

## 2024-12-09 DIAGNOSIS — Z13.29 SCREENING FOR THYROID DISORDER: ICD-10-CM

## 2024-12-09 DIAGNOSIS — D50.9 IRON DEFICIENCY ANEMIA, UNSPECIFIED IRON DEFICIENCY ANEMIA TYPE: Primary | ICD-10-CM

## 2024-12-09 DIAGNOSIS — D50.9 IRON DEFICIENCY ANEMIA, UNSPECIFIED IRON DEFICIENCY ANEMIA TYPE: ICD-10-CM

## 2024-12-09 DIAGNOSIS — J30.9 ALLERGIC RHINITIS, UNSPECIFIED SEASONALITY, UNSPECIFIED TRIGGER: ICD-10-CM

## 2024-12-09 DIAGNOSIS — E66.3 OVERWEIGHT (BMI 25.0-29.9): ICD-10-CM

## 2024-12-09 LAB
ALBUMIN SERPL-MCNC: 4.5 G/DL (ref 3.5–5.2)
ALBUMIN/GLOB SERPL: 1.6 {RATIO} (ref 1–2.5)
ALP SERPL-CCNC: 30 U/L (ref 35–104)
ALT SERPL-CCNC: 27 U/L (ref 10–35)
ANION GAP SERPL CALCULATED.3IONS-SCNC: 10 MMOL/L (ref 9–16)
AST SERPL-CCNC: 33 U/L (ref 10–35)
BILIRUB SERPL-MCNC: 1.6 MG/DL (ref 0–1.2)
BUN SERPL-MCNC: 7 MG/DL (ref 6–20)
CALCIUM SERPL-MCNC: 9.6 MG/DL (ref 8.6–10.4)
CHLORIDE SERPL-SCNC: 105 MMOL/L (ref 98–107)
CO2 SERPL-SCNC: 24 MMOL/L (ref 20–31)
CREAT SERPL-MCNC: 0.6 MG/DL (ref 0.6–0.9)
ERYTHROCYTE [DISTWIDTH] IN BLOOD BY AUTOMATED COUNT: 12.2 % (ref 11.8–14.4)
FERRITIN SERPL-MCNC: 23 NG/ML (ref 15–150)
FOLATE SERPL-MCNC: 10.7 NG/ML (ref 4.8–24.2)
GFR, ESTIMATED: >90 ML/MIN/1.73M2
GLUCOSE SERPL-MCNC: 103 MG/DL (ref 74–99)
HCT VFR BLD AUTO: 39 % (ref 36.3–47.1)
HGB BLD-MCNC: 13.2 G/DL (ref 11.9–15.1)
IRON SATN MFR SERPL: 31 % (ref 20–55)
IRON SERPL-MCNC: 92 UG/DL (ref 37–145)
MCH RBC QN AUTO: 31 PG (ref 25.2–33.5)
MCHC RBC AUTO-ENTMCNC: 33.8 G/DL (ref 28.4–34.8)
MCV RBC AUTO: 91.5 FL (ref 82.6–102.9)
NRBC BLD-RTO: 0 PER 100 WBC
PLATELET # BLD AUTO: 299 K/UL (ref 138–453)
PMV BLD AUTO: 10 FL (ref 8.1–13.5)
POTASSIUM SERPL-SCNC: 4.6 MMOL/L (ref 3.7–5.3)
PROT SERPL-MCNC: 7.4 G/DL (ref 6.6–8.7)
RBC # BLD AUTO: 4.26 M/UL (ref 3.95–5.11)
SODIUM SERPL-SCNC: 139 MMOL/L (ref 136–145)
TIBC SERPL-MCNC: 295 UG/DL (ref 250–450)
TSH SERPL DL<=0.05 MIU/L-ACNC: 0.77 UIU/ML (ref 0.27–4.2)
UNSATURATED IRON BINDING CAPACITY: 203 UG/DL (ref 112–347)
VIT B12 SERPL-MCNC: 606 PG/ML (ref 232–1245)
WBC OTHER # BLD: 5 K/UL (ref 3.5–11.3)

## 2024-12-09 PROCEDURE — G8427 DOCREV CUR MEDS BY ELIG CLIN: HCPCS | Performed by: NURSE PRACTITIONER

## 2024-12-09 PROCEDURE — G8417 CALC BMI ABV UP PARAM F/U: HCPCS | Performed by: NURSE PRACTITIONER

## 2024-12-09 PROCEDURE — 99214 OFFICE O/P EST MOD 30 MIN: CPT | Performed by: NURSE PRACTITIONER

## 2024-12-09 PROCEDURE — G8484 FLU IMMUNIZE NO ADMIN: HCPCS | Performed by: NURSE PRACTITIONER

## 2024-12-09 PROCEDURE — 1036F TOBACCO NON-USER: CPT | Performed by: NURSE PRACTITIONER

## 2024-12-09 ASSESSMENT — ENCOUNTER SYMPTOMS
WHEEZING: 0
SINUS PRESSURE: 0
DIARRHEA: 0
EYE DISCHARGE: 0
NAUSEA: 0
EYE REDNESS: 0
TROUBLE SWALLOWING: 0
SHORTNESS OF BREATH: 0
CHEST TIGHTNESS: 0
SORE THROAT: 0
SINUS PAIN: 0
EYE ITCHING: 0
VOMITING: 0
ABDOMINAL PAIN: 0
COUGH: 0

## 2024-12-09 ASSESSMENT — PATIENT HEALTH QUESTIONNAIRE - PHQ9
1. LITTLE INTEREST OR PLEASURE IN DOING THINGS: NOT AT ALL
SUM OF ALL RESPONSES TO PHQ QUESTIONS 1-9: 0
SUM OF ALL RESPONSES TO PHQ QUESTIONS 1-9: 0
SUM OF ALL RESPONSES TO PHQ9 QUESTIONS 1 & 2: 0
2. FEELING DOWN, DEPRESSED OR HOPELESS: NOT AT ALL
SUM OF ALL RESPONSES TO PHQ QUESTIONS 1-9: 0
SUM OF ALL RESPONSES TO PHQ QUESTIONS 1-9: 0

## 2024-12-09 NOTE — PROGRESS NOTES
[facility]  [facility] PRIMARY CARE  [address] [place] [address] [address]  [address]  [facility] [address]  Dept: [phone]  Dept Fax: [phone]    Norman Dumont is a 34 y.o. female who presents today for her medical conditions/complaintsas noted below.  Norman Dumont is c/o of Weight Management (3 mo f/u, adipex)    HPI:     Patient presents for an office visit  Blood pressure stable  Weight is up 5 pounds    Patient presents for an office visit  Weight is up  She has been taking some time off the gym. Its also the holidays, so her diet has not been as good.      C/o phlegm. Often coughs up in the am or t/o the day. She feels like its always been there. Yellow and thick at times.   Would like to get a humidifier     Iud now. Feels like iron is ok. Would like it checked.     Struggle with bv. She does follow with gyn.     Admits to a lot of cancer in her family. Especially on dads side.         Past Medical History:   Diagnosis Date     delivery delivered 2017    Formatting of this note might be different from the original.  For fetal intolerance of labor      Infertility, female     IVF to have only pregnancy    Iron deficiency anemia     Macromastia     Menorrhagia       Past Surgical History:   Procedure Laterality Date     SECTION      OTHER SURGICAL HISTORY  2008    Keloid scar removal from ears       Family History   Problem Relation Age of Onset    High Blood Pressure Mother     High Blood Pressure Father     High Blood Pressure Sister     Asthma Brother     High Blood Pressure Paternal Grandmother        Social History     Tobacco Use    Smoking status: Never    Smokeless tobacco: Never   Substance Use Topics    Alcohol use: No      Current Outpatient Medications   Medication Sig Dispense Refill    phentermine (ADIPEX-P) 37.5 MG tablet Take 1 tablet by mouth every morning (before breakfast) for 30 days. Max Daily Amount: 37.5 mg 30 tablet 0     Current

## 2025-01-09 DIAGNOSIS — E66.3 OVERWEIGHT (BMI 25.0-29.9): ICD-10-CM

## 2025-01-10 RX ORDER — PHENTERMINE HYDROCHLORIDE 37.5 MG/1
TABLET ORAL
Qty: 30 TABLET | Refills: 0 | Status: SHIPPED | OUTPATIENT
Start: 2025-01-10 | End: 2025-02-09

## 2025-03-01 DIAGNOSIS — E66.3 OVERWEIGHT (BMI 25.0-29.9): ICD-10-CM

## 2025-03-02 RX ORDER — PHENTERMINE HYDROCHLORIDE 37.5 MG/1
TABLET ORAL
Qty: 30 TABLET | Refills: 0 | Status: SHIPPED | OUTPATIENT
Start: 2025-03-02 | End: 2025-04-01

## 2025-03-10 ENCOUNTER — TELEPHONE (OUTPATIENT)
Dept: PRIMARY CARE CLINIC | Age: 35
End: 2025-03-10

## 2025-03-28 SDOH — ECONOMIC STABILITY: FOOD INSECURITY: WITHIN THE PAST 12 MONTHS, THE FOOD YOU BOUGHT JUST DIDN'T LAST AND YOU DIDN'T HAVE MONEY TO GET MORE.: NEVER TRUE

## 2025-03-28 SDOH — ECONOMIC STABILITY: INCOME INSECURITY: IN THE LAST 12 MONTHS, WAS THERE A TIME WHEN YOU WERE NOT ABLE TO PAY THE MORTGAGE OR RENT ON TIME?: NO

## 2025-03-28 SDOH — ECONOMIC STABILITY: FOOD INSECURITY: WITHIN THE PAST 12 MONTHS, YOU WORRIED THAT YOUR FOOD WOULD RUN OUT BEFORE YOU GOT MONEY TO BUY MORE.: NEVER TRUE

## 2025-03-28 ASSESSMENT — PATIENT HEALTH QUESTIONNAIRE - PHQ9
SUM OF ALL RESPONSES TO PHQ QUESTIONS 1-9: 0
SUM OF ALL RESPONSES TO PHQ QUESTIONS 1-9: 0
2. FEELING DOWN, DEPRESSED OR HOPELESS: NOT AT ALL
2. FEELING DOWN, DEPRESSED OR HOPELESS: NOT AT ALL
SUM OF ALL RESPONSES TO PHQ QUESTIONS 1-9: 0
1. LITTLE INTEREST OR PLEASURE IN DOING THINGS: NOT AT ALL
1. LITTLE INTEREST OR PLEASURE IN DOING THINGS: NOT AT ALL
SUM OF ALL RESPONSES TO PHQ QUESTIONS 1-9: 0
SUM OF ALL RESPONSES TO PHQ9 QUESTIONS 1 & 2: 0

## 2025-03-31 ENCOUNTER — OFFICE VISIT (OUTPATIENT)
Dept: PRIMARY CARE CLINIC | Age: 35
End: 2025-03-31
Payer: COMMERCIAL

## 2025-03-31 VITALS
WEIGHT: 159 LBS | RESPIRATION RATE: 15 BRPM | BODY MASS INDEX: 30.04 KG/M2 | OXYGEN SATURATION: 98 % | SYSTOLIC BLOOD PRESSURE: 112 MMHG | HEART RATE: 63 BPM | DIASTOLIC BLOOD PRESSURE: 78 MMHG

## 2025-03-31 DIAGNOSIS — L91.0 KELOID SCAR: Primary | ICD-10-CM

## 2025-03-31 PROCEDURE — G8417 CALC BMI ABV UP PARAM F/U: HCPCS | Performed by: NURSE PRACTITIONER

## 2025-03-31 PROCEDURE — 99213 OFFICE O/P EST LOW 20 MIN: CPT | Performed by: NURSE PRACTITIONER

## 2025-03-31 PROCEDURE — G8427 DOCREV CUR MEDS BY ELIG CLIN: HCPCS | Performed by: NURSE PRACTITIONER

## 2025-03-31 PROCEDURE — 1036F TOBACCO NON-USER: CPT | Performed by: NURSE PRACTITIONER

## 2025-03-31 ASSESSMENT — ENCOUNTER SYMPTOMS
NAUSEA: 0
COLOR CHANGE: 0
VOMITING: 0
CHEST TIGHTNESS: 0
ABDOMINAL PAIN: 0
SHORTNESS OF BREATH: 0
DIARRHEA: 0
SORE THROAT: 0
RHINORRHEA: 0

## 2025-03-31 NOTE — PROGRESS NOTES
[facility]  [facility] PRIMARY CARE  [address] [place] [address] [address]  [address]  [facility] [address]  Dept: [phone]  Dept Fax: [phone]    Norman Dumont is a 34 y.o. female who presentstoday for her medical conditions/complaints as noted below.  Norman Dumont is c/o of  Chief Complaint   Patient presents with    Other     Referral to dermatology- keloid on left earlobe.         HPI:     History of Present Illness  The patient presents for evaluation of a keloid on her left ear.   She notes having large keloids on both ears when she was in her teens. She notes ears were pierced when she was an infant and she feels issues arose when she tried metals other than gold as she got older. She previously had both keloids removed surgically.  Now after having ears pierced again last year, she has progressively enlarging keloid on the left ear, worsening over the past 4 months.  There is no associated bleeding or pain.      Hemoglobin A1C (%)   Date Value   2016 4.5             ( goal A1C is < 7)   No components found for: \"LABMICR\"  No components found for: \"LDLCHOLESTEROL\", \"LDLCALC\"    (goal LDL is <100)   AST (U/L)   Date Value   2024 33     ALT (U/L)   Date Value   2024 27     BUN (mg/dL)   Date Value   2024 7     BP Readings from Last 3 Encounters:   25 112/78   24 112/72   24 116/74          ([id number])    Past Medical History:   Diagnosis Date     delivery delivered 2017    Formatting of this note might be different from the original.  For fetal intolerance of labor      Infertility, female     IVF to have only pregnancy    Iron deficiency anemia     Macromastia     Menorrhagia       Past Surgical History:   Procedure Laterality Date     SECTION  2017    OTHER SURGICAL HISTORY      Keloid scar removal from ears       Family History   Problem Relation Age of Onset    High Blood Pressure Mother     High Blood Pressure Father

## 2025-04-15 DIAGNOSIS — E66.3 OVERWEIGHT (BMI 25.0-29.9): ICD-10-CM

## 2025-04-16 RX ORDER — PHENTERMINE HYDROCHLORIDE 37.5 MG/1
TABLET ORAL
Qty: 30 TABLET | OUTPATIENT
Start: 2025-04-16

## 2025-06-02 NOTE — TELEPHONE ENCOUNTER
The patient called asking for a refill of her Phentermine to her pharmacy and states that back in 01/2025 it was denied by the office.    Per the patient's chart, it was denied for needing an appointment.    Since then the patient has been discharged from the office on 05/16/2025 for no show history.    Writer advised the patient of this and the patient states that the letter that she just got said the office would cover her medication refills for 30 days.    The writer advised that Phentermine is a controlled medication and that she would need to be seen for a refill, but as she was dismissed from the office he is unsure if this can be filled at this time.    Please advise.

## 2025-06-27 ENCOUNTER — HOSPITAL ENCOUNTER (OUTPATIENT)
Age: 35
Setting detail: SPECIMEN
Discharge: HOME | End: 2025-06-27

## 2025-06-27 ENCOUNTER — OFFICE VISIT (OUTPATIENT)
Dept: PRIMARY CARE CLINIC | Age: 35
End: 2025-06-27
Payer: COMMERCIAL

## 2025-06-27 VITALS
BODY MASS INDEX: 30.55 KG/M2 | SYSTOLIC BLOOD PRESSURE: 100 MMHG | HEART RATE: 66 BPM | OXYGEN SATURATION: 99 % | DIASTOLIC BLOOD PRESSURE: 74 MMHG | WEIGHT: 161.8 LBS | HEIGHT: 61 IN

## 2025-06-27 DIAGNOSIS — R68.89 DIFFICULTY LOSING WEIGHT: ICD-10-CM

## 2025-06-27 DIAGNOSIS — Z13.6 ENCOUNTER FOR LIPID SCREENING FOR CARDIOVASCULAR DISEASE: ICD-10-CM

## 2025-06-27 DIAGNOSIS — Z13.220 ENCOUNTER FOR LIPID SCREENING FOR CARDIOVASCULAR DISEASE: ICD-10-CM

## 2025-06-27 DIAGNOSIS — R41.840 CONCENTRATION DEFICIT: Primary | ICD-10-CM

## 2025-06-27 DIAGNOSIS — E55.9 VITAMIN D DEFICIENCY: ICD-10-CM

## 2025-06-27 DIAGNOSIS — Z86.39 HISTORY OF IRON DEFICIENCY: ICD-10-CM

## 2025-06-27 DIAGNOSIS — L91.0 KELOID SCAR: ICD-10-CM

## 2025-06-27 DIAGNOSIS — R41.840 CONCENTRATION DEFICIT: ICD-10-CM

## 2025-06-27 DIAGNOSIS — Z71.3 ENCOUNTER FOR WEIGHT LOSS COUNSELING: ICD-10-CM

## 2025-06-27 DIAGNOSIS — Z13.1 SCREENING FOR DIABETES MELLITUS (DM): ICD-10-CM

## 2025-06-27 DIAGNOSIS — Z00.00 HEALTHCARE MAINTENANCE: ICD-10-CM

## 2025-06-27 DIAGNOSIS — R76.8 HSV-2 SEROPOSITIVE: ICD-10-CM

## 2025-06-27 DIAGNOSIS — Z78.9 NEVER SMOKED TOBACCO: ICD-10-CM

## 2025-06-27 PROBLEM — N92.0 MENORRHAGIA WITH REGULAR CYCLE: Status: RESOLVED | Noted: 2019-01-03 | Resolved: 2025-06-27

## 2025-06-27 PROBLEM — N62 MACROMASTIA: Status: RESOLVED | Noted: 2019-01-03 | Resolved: 2025-06-27

## 2025-06-27 PROBLEM — M54.6 CHRONIC BILATERAL THORACIC BACK PAIN: Status: RESOLVED | Noted: 2019-01-03 | Resolved: 2025-06-27

## 2025-06-27 PROBLEM — G89.29 CHRONIC BILATERAL THORACIC BACK PAIN: Status: RESOLVED | Noted: 2019-01-03 | Resolved: 2025-06-27

## 2025-06-27 LAB
25(OH)D3 SERPL-MCNC: 12.5 NG/ML (ref 30–100)
ALBUMIN SERPL-MCNC: 4.4 G/DL (ref 3.5–5.2)
ALBUMIN/GLOB SERPL: 1.4 {RATIO} (ref 1–2.5)
ALP SERPL-CCNC: 35 U/L (ref 35–104)
ALT SERPL-CCNC: 33 U/L (ref 10–35)
ANION GAP SERPL CALCULATED.3IONS-SCNC: 10 MMOL/L (ref 9–16)
AST SERPL-CCNC: 23 U/L (ref 10–35)
BILIRUB SERPL-MCNC: 1.5 MG/DL (ref 0–1.2)
BUN SERPL-MCNC: 11 MG/DL (ref 6–20)
CALCIUM SERPL-MCNC: 9.9 MG/DL (ref 8.6–10.4)
CHLORIDE SERPL-SCNC: 105 MMOL/L (ref 98–107)
CHOLEST SERPL-MCNC: 149 MG/DL (ref 0–199)
CHOLESTEROL/HDL RATIO: 2.1
CO2 SERPL-SCNC: 24 MMOL/L (ref 20–31)
CREAT SERPL-MCNC: 0.6 MG/DL (ref 0.6–0.9)
ERYTHROCYTE [DISTWIDTH] IN BLOOD BY AUTOMATED COUNT: 11.8 % (ref 11.8–14.4)
EST. AVERAGE GLUCOSE BLD GHB EST-MCNC: 82 MG/DL
FERRITIN SERPL-MCNC: 45 NG/ML (ref 15–150)
GFR, ESTIMATED: >90 ML/MIN/1.73M2
GLUCOSE SERPL-MCNC: 89 MG/DL (ref 74–99)
HBA1C MFR BLD: 4.5 % (ref 4–6)
HCT VFR BLD AUTO: 40 % (ref 36.3–47.1)
HDLC SERPL-MCNC: 70 MG/DL
HGB BLD-MCNC: 13.6 G/DL (ref 11.9–15.1)
IRON SATN MFR SERPL: 39 % (ref 20–55)
IRON SERPL-MCNC: 116 UG/DL (ref 37–145)
LDLC SERPL CALC-MCNC: 70 MG/DL (ref 0–100)
MCH RBC QN AUTO: 31.5 PG (ref 25.2–33.5)
MCHC RBC AUTO-ENTMCNC: 34 G/DL (ref 28.4–34.8)
MCV RBC AUTO: 92.6 FL (ref 82.6–102.9)
NRBC BLD-RTO: 0 PER 100 WBC
PLATELET # BLD AUTO: 337 K/UL (ref 138–453)
PMV BLD AUTO: 10.4 FL (ref 8.1–13.5)
POTASSIUM SERPL-SCNC: 4.5 MMOL/L (ref 3.7–5.3)
PROT SERPL-MCNC: 7.5 G/DL (ref 6.6–8.7)
RBC # BLD AUTO: 4.32 M/UL (ref 3.95–5.11)
SODIUM SERPL-SCNC: 139 MMOL/L (ref 136–145)
TIBC SERPL-MCNC: 296 UG/DL (ref 250–450)
TRIGL SERPL-MCNC: 43 MG/DL (ref 0–149)
TSH SERPL DL<=0.05 MIU/L-ACNC: 0.62 UIU/ML (ref 0.27–4.2)
UNSATURATED IRON BINDING CAPACITY: 180 UG/DL (ref 112–347)
VLDLC SERPL CALC-MCNC: 9 MG/DL (ref 1–30)
WBC OTHER # BLD: 4.3 K/UL (ref 3.5–11.3)

## 2025-06-27 PROCEDURE — 99214 OFFICE O/P EST MOD 30 MIN: CPT | Performed by: STUDENT IN AN ORGANIZED HEALTH CARE EDUCATION/TRAINING PROGRAM

## 2025-06-27 ASSESSMENT — PATIENT HEALTH QUESTIONNAIRE - PHQ9
SUM OF ALL RESPONSES TO PHQ QUESTIONS 1-9: 0
1. LITTLE INTEREST OR PLEASURE IN DOING THINGS: NOT AT ALL
2. FEELING DOWN, DEPRESSED OR HOPELESS: NOT AT ALL
SUM OF ALL RESPONSES TO PHQ QUESTIONS 1-9: 0

## 2025-06-27 NOTE — PATIENT INSTRUCTIONS
I have generated a referral for [facility].  If you have not heard from them in 4 business days, you can call the number on the order sheet and schedule an appointment to get established. If you have any issues getting scheduled, please call the office to let me know.     Check out the website [email or web address] to see if there is a dietitian covered by your insurance plan to help you reach your weight loss goals.    The lab here in the office is open on:  Mon-Thurs  07:30-3:45p (closed from 12:30-1p for lunch)  Friday   8-12p    The office and lab are closed on weekends.

## 2025-06-27 NOTE — PROGRESS NOTES
[facility]  [address] [place] [address].  [place] [address]  Phone: [phone]  Fax: [phone]    Office Visit Note    Date of Visit: 2025   Patient Name: Norman Dumont   Patient :  1990     ASSESSMENT/PLAN     1. Concentration deficit  Assessment & Plan:  - noted improvement in focus with adipex  - Considering evaluation for ADHD  - Neuropsychological evaluation at the [facility] recommended  - If too expensive, contact insurance for alternatives  Orders:  -     Amb External Referral To Neuropsychology  -     TSH reflex to FT4; Future  2. Vitamin D deficiency  Assessment & Plan:  - vit d ordered today- continue current treatment plan unless labs indicate change is necessary  Orders:  -     Vitamin D 25 Hydroxy; Future  3. Keloid scar  Assessment & Plan:  - History of keloid scars  - New scar on one ear post ear repair  - Appointment scheduled with Dr. Alexandre  4. History of iron deficiency  Assessment & Plan:  - No current issue  - Cause of heavy menstrual bleeding remains undetermined  Orders:  -     Iron and TIBC; Future  -     Ferritin; Future  5. Difficulty losing weight  -     TSH reflex to FT4; Future  6. HSV-2 seropositive  Assessment & Plan:  - No outbreaks in several years  7. Healthcare maintenance  -     CBC; Future  -     Comprehensive Metabolic Panel; Future  8. Encounter for lipid screening for cardiovascular disease  -     Lipid, Fasting; Future  9. Encounter for weight loss counseling  Overview:  Previously on Adipex (effective, no SE). Starting weight 161lbs- goal weight 135lbs. 5% loss: 8lbs (weight of 153lbs).  Assessment & Plan:  - Weight today is 161 pounds, goal weight is 135 pounds  - Consult dietitian for dietary guidance  - Increase physical activity  - Sign controlled substance agreement before starting Adipex  - Order A1c, lipid panel, thyroid function tests, vitamin D levels, iron panel, and comprehensive metabolic panel  10. Screening for diabetes

## 2025-07-08 ENCOUNTER — RESULTS FOLLOW-UP (OUTPATIENT)
Dept: PRIMARY CARE CLINIC | Age: 35
End: 2025-07-08

## 2025-07-08 PROBLEM — E66.811 CLASS 1 OBESITY DUE TO EXCESS CALORIES WITHOUT SERIOUS COMORBIDITY WITH BODY MASS INDEX (BMI) OF 32.0 TO 32.9 IN ADULT: Status: RESOLVED | Noted: 2022-09-28 | Resolved: 2025-07-08

## 2025-07-08 PROBLEM — E66.09 CLASS 1 OBESITY DUE TO EXCESS CALORIES WITHOUT SERIOUS COMORBIDITY WITH BODY MASS INDEX (BMI) OF 32.0 TO 32.9 IN ADULT: Status: RESOLVED | Noted: 2022-09-28 | Resolved: 2025-07-08

## 2025-07-08 PROBLEM — Z78.9 NEVER SMOKED TOBACCO: Status: ACTIVE | Noted: 2025-07-08

## 2025-07-08 NOTE — ASSESSMENT & PLAN NOTE
- History of keloid scars  - New scar on one ear post ear repair  - Appointment scheduled with Dr. Alexandre

## 2025-07-08 NOTE — ASSESSMENT & PLAN NOTE
- Weight today is 161 pounds, goal weight is 135 pounds  - Consult dietitian for dietary guidance  - Increase physical activity  - Sign controlled substance agreement before starting Adipex  - Order A1c, lipid panel, thyroid function tests, vitamin D levels, iron panel, and comprehensive metabolic panel

## 2025-07-08 NOTE — ASSESSMENT & PLAN NOTE
- noted improvement in focus with adipex  - Considering evaluation for ADHD  - Neuropsychological evaluation at the [facility] recommended  - If too expensive, contact insurance for alternatives

## 2025-07-09 ENCOUNTER — OFFICE VISIT (OUTPATIENT)
Dept: PRIMARY CARE CLINIC | Age: 35
End: 2025-07-09
Payer: COMMERCIAL

## 2025-07-09 VITALS
HEIGHT: 61 IN | SYSTOLIC BLOOD PRESSURE: 110 MMHG | DIASTOLIC BLOOD PRESSURE: 66 MMHG | HEART RATE: 58 BPM | OXYGEN SATURATION: 99 % | BODY MASS INDEX: 30.85 KG/M2 | WEIGHT: 163.4 LBS

## 2025-07-09 DIAGNOSIS — E66.811 CLASS 1 OBESITY DUE TO EXCESS CALORIES WITH SERIOUS COMORBIDITY AND BODY MASS INDEX (BMI) OF 30.0 TO 30.9 IN ADULT: ICD-10-CM

## 2025-07-09 DIAGNOSIS — Z00.00 ENCOUNTER FOR WELL ADULT EXAM WITHOUT ABNORMAL FINDINGS: Primary | ICD-10-CM

## 2025-07-09 DIAGNOSIS — E66.09 CLASS 1 OBESITY DUE TO EXCESS CALORIES WITH SERIOUS COMORBIDITY AND BODY MASS INDEX (BMI) OF 30.0 TO 30.9 IN ADULT: ICD-10-CM

## 2025-07-09 DIAGNOSIS — Z71.3 ENCOUNTER FOR WEIGHT LOSS COUNSELING: ICD-10-CM

## 2025-07-09 DIAGNOSIS — Z86.39 HISTORY OF IRON DEFICIENCY: ICD-10-CM

## 2025-07-09 DIAGNOSIS — E55.9 VITAMIN D DEFICIENCY: ICD-10-CM

## 2025-07-09 DIAGNOSIS — E66.3 OVERWEIGHT (BMI 25.0-29.9): ICD-10-CM

## 2025-07-09 PROCEDURE — 99214 OFFICE O/P EST MOD 30 MIN: CPT | Performed by: STUDENT IN AN ORGANIZED HEALTH CARE EDUCATION/TRAINING PROGRAM

## 2025-07-09 PROCEDURE — 99395 PREV VISIT EST AGE 18-39: CPT | Performed by: STUDENT IN AN ORGANIZED HEALTH CARE EDUCATION/TRAINING PROGRAM

## 2025-07-09 RX ORDER — PHENTERMINE HYDROCHLORIDE 37.5 MG/1
37.5 TABLET ORAL
Qty: 30 TABLET | Refills: 0 | Status: SHIPPED | OUTPATIENT
Start: 2025-07-09 | End: 2025-08-08

## 2025-07-09 RX ORDER — CHOLECALCIFEROL (VITAMIN D3) 25 MCG
1000 TABLET ORAL DAILY
Qty: 90 TABLET | Refills: 3 | Status: SHIPPED | OUTPATIENT
Start: 2025-07-09 | End: 2026-07-04

## 2025-07-09 NOTE — ASSESSMENT & PLAN NOTE
- vit D at 12.5- recommend 800-1000IU qd for replacement  - Recheck planned for 10/2025 or 11/2025  - If level remains low, consider higher dose of 50,000 units weekly during winter

## 2025-07-09 NOTE — PROGRESS NOTES
[facility]  [address] [place] [address].  [place] [address]  Phone: [phone]  Fax: [phone]    Date of Visit: 2025   Patient Name: Norman Dumont   Patient :  1990     ASSESSMENT/PLAN   Norman Dumont is a 35 y.o. female who is here for a complete physical exam. Preventative screenings reviewed with patient today.     1. Encounter for well adult exam without abnormal findings  Comments:  - UTD w/ age appropriate screenings  Orders:  -     phentermine (ADIPEX-P) 37.5 MG tablet; Take 1 tablet by mouth every morning (before breakfast) for 30 days. Max Daily Amount: 37.5 mg, Disp-30 tablet, R-0Normal  2. Vitamin D deficiency  Assessment & Plan:  - vit D at 12.5- recommend 800-1000IU qd for replacement  - Recheck planned for 10/2025 or 2025  - If level remains low, consider higher dose of 50,000 units weekly during winter  Orders:  -     vitamin D3 (CHOLECALCIFEROL) 25 MCG (1000 UT) TABS tablet; Take 1 tablet by mouth daily, Disp-90 tablet, R-3Normal  3. History of iron deficiency  Assessment & Plan:  - CBC and iron panel WNL  4. Encounter for weight loss counseling  Overview:  Previously on Adipex (effective, no SE). Starting weight 161lbs- goal weight 135lbs. 5% loss: 8lbs (weight of 153lbs).   Started Adipex on 2025, managed by PCP.  Assessment & Plan:  - Signed controlled substance agreement for Adipex  - Monthly visits for 3 months to monitor weight and waist circumference, aiming for 5% weight loss  - If goal not achieved, consider alternative medications  - Once 5% weight loss target met, appointments every 3 months  - Prescription for Adipex sent to pharmacy  5. Overweight (BMI 25.0-29.9)  6. Class 1 obesity due to excess calories with serious comorbidity and body mass index (BMI) of 30.0 to 30.9 in adult      There are no Patient Instructions on file for this visit.    Return in about 4 weeks (around 2025) for weight loss med check.    COMMUNICATION   Questions/concerns

## 2025-07-16 NOTE — ASSESSMENT & PLAN NOTE
- Signed controlled substance agreement for Adipex  - Monthly visits for 3 months to monitor weight and waist circumference, aiming for 5% weight loss  - If goal not achieved, consider alternative medications  - Once 5% weight loss target met, appointments every 3 months  - Prescription for Adipex sent to pharmacy

## 2025-08-11 ENCOUNTER — OFFICE VISIT (OUTPATIENT)
Dept: PRIMARY CARE CLINIC | Age: 35
End: 2025-08-11
Payer: COMMERCIAL

## 2025-08-11 VITALS
OXYGEN SATURATION: 99 % | DIASTOLIC BLOOD PRESSURE: 84 MMHG | SYSTOLIC BLOOD PRESSURE: 110 MMHG | BODY MASS INDEX: 29.15 KG/M2 | HEART RATE: 68 BPM | HEIGHT: 61 IN | WEIGHT: 154.4 LBS

## 2025-08-11 DIAGNOSIS — Z71.3 ENCOUNTER FOR WEIGHT LOSS COUNSELING: Primary | ICD-10-CM

## 2025-08-11 PROCEDURE — 99213 OFFICE O/P EST LOW 20 MIN: CPT | Performed by: STUDENT IN AN ORGANIZED HEALTH CARE EDUCATION/TRAINING PROGRAM

## 2025-08-11 RX ORDER — PHENTERMINE HYDROCHLORIDE 37.5 MG/1
37.5 TABLET ORAL
Qty: 30 TABLET | Refills: 0 | Status: SHIPPED | OUTPATIENT
Start: 2025-08-11 | End: 2025-09-10